# Patient Record
Sex: FEMALE | Race: WHITE | NOT HISPANIC OR LATINO | URBAN - METROPOLITAN AREA
[De-identification: names, ages, dates, MRNs, and addresses within clinical notes are randomized per-mention and may not be internally consistent; named-entity substitution may affect disease eponyms.]

---

## 2023-10-23 NOTE — PROGRESS NOTES
[id number] [address] SMILEY    Assessment/Plan:      Diagnosis ICD-10-CM Associated Orders   1. KEN (generalized anxiety disorder)  F41.1 Comprehensive metabolic panel      2. Screening for deficiency anemia  Z13.0 CBC and differential      3. Screening for lipid disorders  Z13.220 Lipid panel      4. Screening for thyroid disorder  Z13.29 TSH, 3rd generation with Free T4 reflex      5. History of Lyme disease  Z86.19 Lyme Total AB W Reflex to IGM/IGG     Tick-borne Disease, Acute Molecular Panel, Non-Lyme      6. Vitamin D deficiency  E55.9 Vitamin D 25 hydroxy      7. Need for hepatitis C screening test  Z11.59 Hepatitis C antibody      8. Encounter for screening for HIV  Z11.4 HIV 1/2 AG/AB W REFLEX [facility] and [facility] only        Obtain prior medical records  Please schedule eye exam  Obtain fasting bloodwork. No eating/caffeine for 8 hours prior. Drink plenty of water! Return in about 3 months (around 1/24/2024) for Annual physical, F/U after bloodwork. Patient may call or return to office with any questions or concerns. ______________________________________________________________________  Subjective:     Patient ID: Mariusz Vu is a 26 y.o. female. Mariusz Vu  Chief Complaint   Patient presents with    Establish Care       Here to establish care  Sees Dr. Attila Gutierrez, Dx PCOS. Not on OCP per choice. [place]CP [place], Dr. Amaury Duenas    Hx:  PCOS, acne vulgaris, KEN, auditory processing disorder (16yrs old), hx lymes disease tx with abx    Diet:    Breakfast:  breakfast sandwich, coffee (works at [employer] for the last 4 years)  Lunch:  salads, chicken tenders/fast, lunch meat sandwiches  Dinner:  well balanced    Alcohol: socially. Recently turned 23yr old. Tobacco:  none  Medical marijuana card for anxiety, smoking marijuana helps her fall asleep.       Can't remember last time she had blood work  Notices squinting at [employer] recently, no recent eye exam      Feels cold intolerance, weight loss of 42 lbs over the last 2 years due to going to the gym but then stopped and continued to lose weight.  +night sweats occasionally. Tested for lyme came back positive when sister became +tick borne illness, treated with antibiotics at that time. Thinks she was 18yr old. Depression Screening and Follow-up Plan: Patient was screened for depression during today's encounter. They screened negative with a PHQ-2 score of 0. The following portions of the patient's history were reviewed and updated as appropriate: allergies, current medications, past family history, past medical history, past social history, past surgical history, and problem list.    Review of Systems   Constitutional:  Positive for chills and unexpected weight change. Negative for activity change, appetite change, fatigue and fever. HENT:  Negative for congestion, ear pain, hearing loss, postnasal drip, sinus pain and trouble swallowing. Eyes: Negative. Respiratory: Negative. Negative for cough and shortness of breath. Cardiovascular: Negative. Negative for chest pain and leg swelling. Gastrointestinal: Negative. Negative for constipation and diarrhea. Endocrine: Negative. Genitourinary:  Positive for menstrual problem. Negative for dysuria. Musculoskeletal: Negative. Skin: Negative. Allergic/Immunologic: Negative. Negative for immunocompromised state. Neurological: Negative. Negative for dizziness, light-headedness and headaches. Hematological: Negative. Psychiatric/Behavioral:  Positive for sleep disturbance. The patient is nervous/anxious. Marijuana use helps with sleep. Objective:      Vitals:    10/24/23 1507   BP: 96/62   Pulse: 79   SpO2: 99%      Physical Exam  Vitals and nursing note reviewed. Constitutional:       Appearance: Normal appearance. HENT:      Head: Normocephalic and atraumatic.       Right Ear: Tympanic membrane, ear canal and external ear normal.      Left Ear: Tympanic membrane, ear canal and external ear normal.      Nose: Nose normal.      Mouth/Throat:      Mouth: Mucous membranes are moist.      Pharynx: Oropharynx is clear. Eyes:      Extraocular Movements: Extraocular movements intact. Conjunctiva/sclera: Conjunctivae normal.      Pupils: Pupils are equal, round, and reactive to light. Cardiovascular:      Rate and Rhythm: Normal rate and regular rhythm. Pulses: Normal pulses. Heart sounds: Normal heart sounds. Pulmonary:      Effort: Pulmonary effort is normal.      Breath sounds: Normal breath sounds. Abdominal:      General: Bowel sounds are normal.      Palpations: Abdomen is soft. Musculoskeletal:         General: Normal range of motion. Cervical back: Normal range of motion and neck supple. Skin:     General: Skin is warm and dry. Comments: Acne vulgaris   Neurological:      General: No focal deficit present. Mental Status: She is alert and oriented to person, place, and time. Psychiatric:         Mood and Affect: Mood normal.         Behavior: Behavior normal.         Thought Content: Thought content normal.         Judgment: Judgment normal.           Portions of the record may have been created with voice recognition software. Occasional wrong word or "sound alike" substitutions may have occurred due to the inherent limitations of voice recognition software. Please review the chart carefully and recognize, using context, where substitutions/typographical errors may have occurred.

## 2023-10-24 ENCOUNTER — OFFICE VISIT (OUTPATIENT)
Dept: FAMILY MEDICINE CLINIC | Facility: HOSPITAL | Age: 23
End: 2023-10-24
Payer: COMMERCIAL

## 2023-10-24 VITALS
HEIGHT: 63 IN | OXYGEN SATURATION: 99 % | HEART RATE: 79 BPM | SYSTOLIC BLOOD PRESSURE: 96 MMHG | BODY MASS INDEX: 20.55 KG/M2 | WEIGHT: 116 LBS | DIASTOLIC BLOOD PRESSURE: 62 MMHG

## 2023-10-24 DIAGNOSIS — Z86.19 HISTORY OF LYME DISEASE: ICD-10-CM

## 2023-10-24 DIAGNOSIS — Z13.220 SCREENING FOR LIPID DISORDERS: ICD-10-CM

## 2023-10-24 DIAGNOSIS — Z13.29 SCREENING FOR THYROID DISORDER: ICD-10-CM

## 2023-10-24 DIAGNOSIS — Z11.4 ENCOUNTER FOR SCREENING FOR HIV: ICD-10-CM

## 2023-10-24 DIAGNOSIS — Z11.59 NEED FOR HEPATITIS C SCREENING TEST: ICD-10-CM

## 2023-10-24 DIAGNOSIS — Z13.0 SCREENING FOR DEFICIENCY ANEMIA: ICD-10-CM

## 2023-10-24 DIAGNOSIS — E55.9 VITAMIN D DEFICIENCY: ICD-10-CM

## 2023-10-24 DIAGNOSIS — F41.1 GAD (GENERALIZED ANXIETY DISORDER): Primary | ICD-10-CM

## 2023-10-24 PROBLEM — H93.25 AUDITORY PROCESSING DISORDER: Status: ACTIVE | Noted: 2023-10-24

## 2023-10-24 PROBLEM — E28.2 PCOS (POLYCYSTIC OVARIAN SYNDROME): Status: ACTIVE | Noted: 2023-10-24

## 2023-10-24 PROCEDURE — 99203 OFFICE O/P NEW LOW 30 MIN: CPT | Performed by: NURSE PRACTITIONER

## 2023-10-24 NOTE — PATIENT INSTRUCTIONS
Obtain prior medical records  Please schedule eye exam  Obtain fasting bloodwork. No eating/caffeine for 8 hours prior. Drink plenty of water!

## 2023-10-25 ENCOUNTER — TELEPHONE (OUTPATIENT)
Dept: ADMINISTRATIVE | Facility: OTHER | Age: 23
End: 2023-10-25

## 2023-10-25 NOTE — LETTER
Procedure Request Form: Cervical Cancer Screening      Date Requested: 10/25/23  Patient: Kezia Davis  Patient : 2000   Referring Provider: SMILEY Sims        Date of Procedure ______________________________       The above patient has informed us that they have completed their   most recent Cervical Cancer Screening at your facility. Please complete   this form and attach all corresponding procedure reports/results. Comments __________________________________________________________  ____________________________________________________________________  ____________________________________________________________________  ____________________________________________________________________    Facility Completing Procedure _________________________________________    Form Completed By (print name) _______________________________________      Signature __________________________________________________________      These reports are needed for  compliance. Please fax this completed form and a copy of the procedure report to our office located at [address] [place] [address] as soon as possible to Fax [phone] attention Dipesh Skinner: Phone [phone]    We thank you for your assistance in treating our mutual patient.

## 2023-10-25 NOTE — TELEPHONE ENCOUNTER
Upon review of the In Basket request and the patient's chart, initial outreach has been made via fax to facility. Please see Contacts section for details.      Thank you  Colin Ingram

## 2023-10-25 NOTE — TELEPHONE ENCOUNTER
----- Message from Abdullahi William sent at 10/24/2023  3:20 PM EDT -----  Regarding: gyn exam  10/24/23 3:21 PM    Hello, our patient Leandro Gar has had Pap Smear (HPV) aka Cervical Cancer Screening completed/performed. Please assist in updating the patient chart by making an External outreach to [address] [place] [address] Dr Grayson Arroyo facility located in [place]. The date of service is 6-12 months ago.     Thank you,  Coco Rizzo MA  [facility]

## 2023-11-30 NOTE — TELEPHONE ENCOUNTER
As a follow-up, a second attempt has been made for outreach via fax to facility. Please see Contacts section for details.     Thank you  Virginia Arroyo

## 2023-12-05 NOTE — TELEPHONE ENCOUNTER
As a final attempt, a third outreach has been made via telephone call to facility. Please see Contacts section for details. This encounter will be closed and completed by end of day. Should we receive the requested information because of previous outreach attempts, the requested patient's chart will be updated appropriately.      Thank you  Faheem Holt

## 2023-12-14 NOTE — TELEPHONE ENCOUNTER
Upon review of the In Basket request we have not received the requested information after three attempts. Any additional questions or concerns should be emailed to the Practice Liaisons via the appropriate education email address, please do not reply via In Basket.     Thank you  Mercedes Weaver

## 2024-01-02 ENCOUNTER — OFFICE VISIT (OUTPATIENT)
Dept: FAMILY MEDICINE CLINIC | Facility: HOSPITAL | Age: 24
End: 2024-01-02
Payer: COMMERCIAL

## 2024-01-02 ENCOUNTER — TELEPHONE (OUTPATIENT)
Dept: FAMILY MEDICINE CLINIC | Facility: HOSPITAL | Age: 24
End: 2024-01-02

## 2024-01-02 VITALS
HEART RATE: 70 BPM | WEIGHT: 114 LBS | DIASTOLIC BLOOD PRESSURE: 60 MMHG | SYSTOLIC BLOOD PRESSURE: 114 MMHG | HEIGHT: 63 IN | OXYGEN SATURATION: 99 % | RESPIRATION RATE: 18 BRPM | BODY MASS INDEX: 20.2 KG/M2

## 2024-01-02 DIAGNOSIS — Z00.00 WELL ADULT EXAM: Primary | ICD-10-CM

## 2024-01-02 DIAGNOSIS — F41.1 GAD (GENERALIZED ANXIETY DISORDER): ICD-10-CM

## 2024-01-02 PROBLEM — E55.9 VITAMIN D INSUFFICIENCY: Status: ACTIVE | Noted: 2024-01-02

## 2024-01-02 PROCEDURE — 99395 PREV VISIT EST AGE 18-39: CPT | Performed by: NURSE PRACTITIONER

## 2024-01-02 NOTE — PROGRESS NOTES
History of Present Illness   Well Adult Physical   Patient here for a comprehensive physical exam.      Here for annual physical.  Obtained bloodwork but I don't have results.  Will reach out to [facility].  Feeling well, persistent anxiety.  Uses medical marijuana with THC.  Discussed how THC can increase anxiety.  Encouraged Kala to find non-THC source as she prefers not to be on medications.  Sees Dr. Fredy MONTOYA for woman's health/PCOS/dysmenorrhea.  Prefers no OCP.    Has not scheduled eye exam, encouraged to do so today as she still notes myopia.          Diet and Physical Activity  Diet: well balanced diet  Weight concerns: None, patient's BMI is between 18.5-24.9  Exercise:  not taking walks as much due to the weather, going to head back to the gym    EtOH: rare/occasional/daily/social/none: occasional     Depression Screen  PHQ-2/9 Depression Screening              General Health  Hearing: Normal:  bilateral  Vision: most recent eye exam >1 year  Dental: regular dental visits     History:  LMP: No LMP recorded.  : 0  Para: 0    Cancer Screening  Colononoscopy N/A  Mammogram N/A  Pap due, follows with GYN  Abnormal pap? no  Smoker never tobacco user, current marijuana use Annual screening with low-dose helical computed tomography (CT) for patients age 55 to 74 years with history of smoking at least 30 pack-years and, if a former smoker, had quit within the previous 15 years         The following portions of the patient's history were reviewed and updated as appropriate: allergies, current medications, past family history, past medical history, past social history, past surgical history and problem list.    Review of Systems     Review of Systems   Constitutional: Negative.  Negative for activity change, appetite change, chills, fatigue and fever.   HENT: Negative.  Negative for congestion, ear pain, postnasal drip and sinus pain.    Eyes: Negative.    Respiratory: Negative.  Negative for cough and  shortness of breath.    Cardiovascular: Negative.  Negative for chest pain and leg swelling.   Gastrointestinal: Negative.  Negative for constipation and diarrhea.   Endocrine: Negative.    Genitourinary:  Positive for menstrual problem. Negative for difficulty urinating and dysuria.        Regularly irregular.  Follows with GYN   Musculoskeletal: Negative.    Skin: Negative.    Allergic/Immunologic: Negative.  Negative for immunocompromised state.   Neurological: Negative.  Negative for dizziness and light-headedness.   Hematological: Negative.    Psychiatric/Behavioral:  Negative for sleep disturbance. The patient is nervous/anxious.        Past Medical History     Past Medical History:   Diagnosis Date    PCOS (polycystic ovarian syndrome)        Past Surgical History     Past Surgical History:   Procedure Laterality Date    WISDOM TOOTH EXTRACTION N/A 06/01/2023       Social History     Social History     Socioeconomic History    Marital status: Single     Spouse name: None    Number of children: None    Years of education: None    Highest education level: None   Occupational History    None   Tobacco Use    Smoking status: Never    Smokeless tobacco: Never   Vaping Use    Vaping status: Never Used   Substance and Sexual Activity    Alcohol use: Yes     Comment: Only occasionally    Drug use: Yes     Comment: medical marijuana    Sexual activity: Yes     Birth control/protection: Condom Male   Other Topics Concern    None   Social History Narrative    None     Social Determinants of Health     Financial Resource Strain: Not on file   Food Insecurity: Not on file   Transportation Needs: Not on file   Physical Activity: Not on file   Stress: Not on file   Social Connections: Not on file   Intimate Partner Violence: Not on file   Housing Stability: Not on file       Family History     Family History   Problem Relation Age of Onset    Thyroid disease Mother     Thyroid disease Father     Thyroid cancer Father      "Colin-Danlos syndrome Sister     No Known Problems Sister     No Known Problems Brother        Current Medications     No current outpatient medications on file.     Allergies     No Known Allergies    Objective     /60   Pulse 70   Resp 18   Ht 5' 3\" (1.6 m)   Wt 51.7 kg (114 lb)   SpO2 99%   BMI 20.19 kg/m²   Wt Readings from Last 3 Encounters:   01/02/24 51.7 kg (114 lb)   10/24/23 52.6 kg (116 lb)     BP Readings from Last 3 Encounters:   01/02/24 114/60   10/24/23 96/62     Pulse Readings from Last 3 Encounters:   01/02/24 70   10/24/23 79     Body mass index is 20.19 kg/m².     Physical Exam  Vitals and nursing note reviewed.   Constitutional:       Appearance: Normal appearance.   HENT:      Head: Normocephalic and atraumatic.      Right Ear: Tympanic membrane, ear canal and external ear normal.      Left Ear: Tympanic membrane, ear canal and external ear normal.      Nose: Nose normal.      Mouth/Throat:      Mouth: Mucous membranes are moist.      Pharynx: Oropharynx is clear.   Eyes:      Extraocular Movements: Extraocular movements intact.      Conjunctiva/sclera: Conjunctivae normal.      Pupils: Pupils are equal, round, and reactive to light.   Cardiovascular:      Rate and Rhythm: Normal rate and regular rhythm.      Pulses: Normal pulses.      Heart sounds: Normal heart sounds.   Pulmonary:      Effort: Pulmonary effort is normal.      Breath sounds: Normal breath sounds.   Abdominal:      General: Bowel sounds are normal.      Palpations: Abdomen is soft.   Musculoskeletal:         General: Normal range of motion.      Cervical back: Normal range of motion and neck supple.   Skin:     General: Skin is warm and dry.   Neurological:      General: No focal deficit present.      Mental Status: She is alert and oriented to person, place, and time.   Psychiatric:         Mood and Affect: Mood normal.         Speech: Speech normal.         Behavior: Behavior normal. Behavior is cooperative.    " "     Thought Content: Thought content normal.         Judgment: Judgment normal.           No results found.    Health Maintenance     Health Maintenance   Topic Date Due    Hepatitis C Screening  Never done    HPV Vaccine (1 - 2-dose series) Never done    HIV Screening  Never done    Chlamydia Screening  Never done    DTaP,Tdap,and Td Vaccines (1 - Tdap) Never done    Cervical Cancer Screening  Never done    COVID-19 Vaccine (2 - 2023-24 season) 04/02/2024 (Originally 9/1/2023)    Influenza Vaccine (1) 06/30/2024 (Originally 9/1/2023)    Depression Screening  10/24/2024    Annual Physical  01/02/2025    Pneumococcal Vaccine: Pediatrics (0 to 5 Years) and At-Risk Patients (6 to 64 Years)  Aged Out    HIB Vaccine  Aged Out    IPV Vaccine  Aged Out    Hepatitis A Vaccine  Aged Out    Meningococcal ACWY Vaccine  Aged Out     Immunization History   Administered Date(s) Administered    COVID-19 J&J ([place]) vaccine 0.5 mL 04/27/2021       Laboratory Results:   No results found for: \"WBC\", \"HGB\", \"HCT\", \"MCV\", \"PLT\"  No results found for: \"BUN\"  No results found for: \"GLUC\", \"ALT\", \"AST\"  No results found for: \"TSH\"  No results found for: \"A1C\"    Lipid Profile:   No results found for: \"CHOL\"  No results found for: \"HDL\"  No results found for: \"LDLC\"  No results found for: \"LDLCALC\"  No results found for: \"TRIG\"      Assessment/Plan     1. Healthy female exam.  2. Patient Counseling:   Nutrition: Stressed importance of a well balanced diet, moderation of sodium/saturated fat, caloric balance and sufficient intake of fiber  Exercise: Stressed the importance of regular exercise with a goal of 150 minutes per week  Dental Health: Discussed daily flossing and brushing and regular dental visits   Sexuality: Discussed sexually transmitted infections, use of condoms and prevention of unintended pregnancy  Alcohol Use:  Recommended moderation of alcohol intake  Injury Prevention: Discussed Safety Belts, Safety Helmets, and Smoke " Detectors    Immunizations reviewed.   Discussed benefits of screening .  Discussed the patient's BMI with her.  The BMI is in the acceptable range  3. Cancer Screening discussed.  4. Labs reaching out to [facility] to obtain blood work results.   5. Follow up next physical in 1 year.    SMILEY Guerrero

## 2024-01-02 NOTE — TELEPHONE ENCOUNTER
----- Message from SMILEY Tinajero sent at 1/2/2024  4:39 PM EST -----  Received Kala's labwork results which are overall stable.  Vitamin D level is insufficient, would recommend starting Vitamin D3 1000 IU daily.  Tick borne panel negative, however awaiting basesia IgG as [facility] is missing testing materials; they will send results as soon as possible.

## 2024-01-02 NOTE — PATIENT INSTRUCTIONS
Consider COVID, FLU vaccine.  Schedule eye exam  Consider obtaining CBD, non THC source marijuana as THC can contribute to anxiety.    Will reach out with labwork results.

## 2024-01-09 LAB
25(OH)D3+25(OH)D2 SERPL-MCNC: 27.2 NG/ML (ref 30–100)
A PHAGOCYTOPH IGG TITR SER IF: NEGATIVE {TITER}
ALBUMIN SERPL-MCNC: 4.5 G/DL (ref 4–5)
ALBUMIN/GLOB SERPL: 2 {RATIO} (ref 1.2–2.2)
ALP SERPL-CCNC: 54 IU/L (ref 44–121)
ALT SERPL-CCNC: 7 IU/L (ref 0–32)
AST SERPL-CCNC: 11 IU/L (ref 0–40)
B BURGDOR IGG+IGM SER QL IA: NEGATIVE
B MICROTI IGG TITR SER: NORMAL {TITER}
BASOPHILS # BLD AUTO: 0 X10E3/UL (ref 0–0.2)
BASOPHILS NFR BLD AUTO: 1 %
BILIRUB SERPL-MCNC: 0.2 MG/DL (ref 0–1.2)
BUN SERPL-MCNC: 10 MG/DL (ref 6–20)
BUN/CREAT SERPL: 14 (ref 9–23)
CALCIUM SERPL-MCNC: 9.7 MG/DL (ref 8.7–10.2)
CHLORIDE SERPL-SCNC: 104 MMOL/L (ref 96–106)
CHOLEST SERPL-MCNC: 128 MG/DL (ref 100–199)
CO2 SERPL-SCNC: 23 MMOL/L (ref 20–29)
CREAT SERPL-MCNC: 0.71 MG/DL (ref 0.57–1)
E CHAFFEENSIS IGG TITR SER IF: NEGATIVE {TITER}
EGFR: 124 ML/MIN/1.73
EOSINOPHIL # BLD AUTO: 0.2 X10E3/UL (ref 0–0.4)
EOSINOPHIL NFR BLD AUTO: 3 %
ERYTHROCYTE [DISTWIDTH] IN BLOOD BY AUTOMATED COUNT: 12.1 % (ref 11.7–15.4)
GLOBULIN SER-MCNC: 2.2 G/DL (ref 1.5–4.5)
GLUCOSE SERPL-MCNC: 90 MG/DL (ref 70–99)
HCT VFR BLD AUTO: 36.7 % (ref 34–46.6)
HCV AB S/CO SERPL IA: NON REACTIVE
HDLC SERPL-MCNC: 56 MG/DL
HGB BLD-MCNC: 12.5 G/DL (ref 11.1–15.9)
HIV 1+2 AB+HIV1 P24 AG SERPL QL IA: NON REACTIVE
IMM GRANULOCYTES # BLD: 0 X10E3/UL (ref 0–0.1)
IMM GRANULOCYTES NFR BLD: 0 %
LDLC SERPL CALC-MCNC: 64 MG/DL (ref 0–99)
LYMPHOCYTES # BLD AUTO: 1.7 X10E3/UL (ref 0.7–3.1)
LYMPHOCYTES NFR BLD AUTO: 30 %
MCH RBC QN AUTO: 32.3 PG (ref 26.6–33)
MCHC RBC AUTO-ENTMCNC: 34.1 G/DL (ref 31.5–35.7)
MCV RBC AUTO: 95 FL (ref 79–97)
MONOCYTES # BLD AUTO: 0.3 X10E3/UL (ref 0.1–0.9)
MONOCYTES NFR BLD AUTO: 6 %
NEUTROPHILS # BLD AUTO: 3.6 X10E3/UL (ref 1.4–7)
NEUTROPHILS NFR BLD AUTO: 60 %
PLATELET # BLD AUTO: 284 X10E3/UL (ref 150–450)
POTASSIUM SERPL-SCNC: 4.2 MMOL/L (ref 3.5–5.2)
PROT SERPL-MCNC: 6.7 G/DL (ref 6–8.5)
RBC # BLD AUTO: 3.87 X10E6/UL (ref 3.77–5.28)
RESULT/COMMENT: NORMAL
SL AMB VLDL CHOLESTEROL CALC: 8 MG/DL (ref 5–40)
SODIUM SERPL-SCNC: 140 MMOL/L (ref 134–144)
TRIGL SERPL-MCNC: 29 MG/DL (ref 0–149)
TSH SERPL DL<=0.005 MIU/L-ACNC: 0.47 UIU/ML (ref 0.45–4.5)
WBC # BLD AUTO: 5.9 X10E3/UL (ref 3.4–10.8)

## 2024-06-24 NOTE — PROGRESS NOTES
[facility]   Marlyn REIS    Assessment/Plan:   1. KEN (generalized anxiety disorder)  Assessment & Plan:  Managed with medical marijuana.  Prefers not to use medications  2. Vitamin D insufficiency  Assessment & Plan:   Latest Reference Range & Units 12/04/23 08:56   25-Hydroxy, Vitamin D 30.0 - 100.0 ng/mL 27.2 (L)   (L): Data is abnormally low    Started taking vitamin D supplementation OTC after results.  She is not sure kind/how much  3. Screening for thyroid disorder  -     TSH, 3rd generation with Free T4 reflex; Future  -     T4, free; Future  -     TSH, 3rd generation with Free T4 reflex  -     T4, free  4. Iron deficiency anemia secondary to inadequate dietary iron intake  -     Ferritin; Future  -     Ferritin  5. Generalized hyperhidrosis  6. PCOS (polycystic ovarian syndrome)  Assessment & Plan:  Followed by Dr. [facility]       Return for F/U after bloodwork.  Patient may call or return to office with any questions or concerns.     ______________________________________________________________________  Subjective:     Patient ID: Kala Bernal is a 23 y.o. female.  Kala Bernal  Chief Complaint   Patient presents with    Excessive Sweating       Notes hyperhidrosis especially axilla/back/neck since the winter notes daytime as well as nighttime.  No fever/chills.  No change in appetite, stays hydrated.  No chest pressure/pain/palpitations.  Bp WNL today and past OV.  Does get dizzy with position changes sometimes; is iron deficient and takes supplement.  Hx PCOS, notes menstruation cycle typically q2-3 months but went 6 months without it; continues to defer OCP.  GYN is aware.  Bowel pattern stable, no diarrhea.  No hair loss.  Fingernails brittle.  Mood stable.  Both parents with thyroid issues.    Did start vitamin D supplementation.   Appetite stable and well balanced.  Reports 45lb weight loss over the past 2 years unintentionally.  Uses medical marijuana with THC for  anxiety.                   The following portions of the patient's history were reviewed and updated as appropriate: allergies, current medications, past family history, past medical history, past social history, past surgical history, and problem list.    Review of Systems   Constitutional:  Positive for diaphoresis and unexpected weight change. Negative for activity change, appetite change, chills, fatigue and fever.   HENT: Negative.  Negative for congestion, ear pain, postnasal drip and sinus pain.    Eyes: Negative.    Respiratory: Negative.  Negative for cough and shortness of breath.    Cardiovascular: Negative.  Negative for chest pain and leg swelling.   Gastrointestinal: Negative.  Negative for constipation and diarrhea.   Endocrine: Positive for cold intolerance and heat intolerance.   Genitourinary:  Positive for menstrual problem. Negative for dysuria.        Irregular due to PCOS   Musculoskeletal: Negative.    Skin: Negative.    Allergic/Immunologic: Negative.  Negative for immunocompromised state.   Neurological:  Positive for dizziness and tremors. Negative for light-headedness.        Occasional tremor in the morning   Hematological: Negative.    Psychiatric/Behavioral:  Positive for sleep disturbance.         Reports waking up with night sweats         Objective:      Vitals:    06/25/24 1013   BP: 102/60   Pulse: 85   SpO2: 99%      Physical Exam  Vitals and nursing note reviewed.   Constitutional:       General: She is awake.      Appearance: Normal appearance.   HENT:      Head: Normocephalic and atraumatic.      Right Ear: Tympanic membrane, ear canal and external ear normal.      Left Ear: Tympanic membrane, ear canal and external ear normal.      Nose: Nose normal.      Mouth/Throat:      Mouth: Mucous membranes are moist.      Pharynx: Oropharynx is clear.   Eyes:      Extraocular Movements: Extraocular movements intact.      Conjunctiva/sclera: Conjunctivae normal.      Pupils: Pupils are  "equal, round, and reactive to light.   Neck:      Thyroid: No thyroid mass, thyromegaly or thyroid tenderness.   Cardiovascular:      Rate and Rhythm: Normal rate and regular rhythm.      Pulses: Normal pulses.      Heart sounds: Normal heart sounds.   Pulmonary:      Effort: Pulmonary effort is normal.      Breath sounds: Normal breath sounds.   Abdominal:      General: Bowel sounds are normal.      Palpations: Abdomen is soft.   Musculoskeletal:         General: Normal range of motion.      Cervical back: Normal range of motion and neck supple.   Lymphadenopathy:      Cervical: No cervical adenopathy.   Skin:     General: Skin is warm and dry.   Neurological:      General: No focal deficit present.      Mental Status: She is alert and oriented to person, place, and time.   Psychiatric:         Mood and Affect: Mood is anxious.         Speech: Speech normal.         Behavior: Behavior normal. Behavior is cooperative.         Thought Content: Thought content normal.         Judgment: Judgment normal.           Portions of the record may have been created with voice recognition software. Occasional wrong word or \"sound alike\" substitutions may have occurred due to the inherent limitations of voice recognition software. Please review the chart carefully and recognize, using context, where substitutions/typographical errors may have occurred.       "

## 2024-06-25 ENCOUNTER — OFFICE VISIT (OUTPATIENT)
Dept: FAMILY MEDICINE CLINIC | Facility: HOSPITAL | Age: 24
End: 2024-06-25
Payer: COMMERCIAL

## 2024-06-25 VITALS
HEART RATE: 85 BPM | DIASTOLIC BLOOD PRESSURE: 60 MMHG | OXYGEN SATURATION: 99 % | WEIGHT: 113 LBS | BODY MASS INDEX: 20.02 KG/M2 | SYSTOLIC BLOOD PRESSURE: 102 MMHG | HEIGHT: 63 IN

## 2024-06-25 DIAGNOSIS — E55.9 VITAMIN D INSUFFICIENCY: ICD-10-CM

## 2024-06-25 DIAGNOSIS — F41.1 GAD (GENERALIZED ANXIETY DISORDER): Primary | ICD-10-CM

## 2024-06-25 DIAGNOSIS — R61 GENERALIZED HYPERHIDROSIS: ICD-10-CM

## 2024-06-25 DIAGNOSIS — D50.8 IRON DEFICIENCY ANEMIA SECONDARY TO INADEQUATE DIETARY IRON INTAKE: ICD-10-CM

## 2024-06-25 DIAGNOSIS — Z13.29 SCREENING FOR THYROID DISORDER: ICD-10-CM

## 2024-06-25 DIAGNOSIS — E28.2 PCOS (POLYCYSTIC OVARIAN SYNDROME): ICD-10-CM

## 2024-06-25 PROCEDURE — 99214 OFFICE O/P EST MOD 30 MIN: CPT | Performed by: NURSE PRACTITIONER

## 2024-06-25 NOTE — ASSESSMENT & PLAN NOTE
Latest Reference Range & Units 12/04/23 08:56   25-Hydroxy, Vitamin D 30.0 - 100.0 ng/mL 27.2 (L)   (L): Data is abnormally low    Started taking vitamin D supplementation OTC after results.  She is not sure kind/how much

## 2024-07-22 ENCOUNTER — OFFICE VISIT (OUTPATIENT)
Dept: FAMILY MEDICINE CLINIC | Facility: HOSPITAL | Age: 24
End: 2024-07-22
Payer: COMMERCIAL

## 2024-07-22 VITALS
DIASTOLIC BLOOD PRESSURE: 56 MMHG | SYSTOLIC BLOOD PRESSURE: 90 MMHG | BODY MASS INDEX: 20.02 KG/M2 | HEART RATE: 76 BPM | OXYGEN SATURATION: 99 % | WEIGHT: 113 LBS

## 2024-07-22 DIAGNOSIS — F32.9 REACTIVE DEPRESSION: ICD-10-CM

## 2024-07-22 DIAGNOSIS — F41.1 GAD (GENERALIZED ANXIETY DISORDER): Primary | ICD-10-CM

## 2024-07-22 PROCEDURE — 99213 OFFICE O/P EST LOW 20 MIN: CPT | Performed by: NURSE PRACTITIONER

## 2024-07-22 RX ORDER — DULOXETIN HYDROCHLORIDE 30 MG/1
30 CAPSULE, DELAYED RELEASE ORAL DAILY
Qty: 30 CAPSULE | Refills: 5 | Status: SHIPPED | OUTPATIENT
Start: 2024-07-22

## 2024-07-22 RX ORDER — HYDROXYZINE HYDROCHLORIDE 10 MG/1
10 TABLET, FILM COATED ORAL EVERY 6 HOURS PRN
Qty: 30 TABLET | Refills: 0 | Status: SHIPPED | OUTPATIENT
Start: 2024-07-22

## 2024-07-22 NOTE — PROGRESS NOTES
[facility]   Marlyn REIS    Assessment/Plan:   1. KEN (generalized anxiety disorder)  -     DULoxetine (CYMBALTA) 30 mg delayed release capsule; Take 1 capsule (30 mg total) by mouth daily  -     hydrOXYzine HCL (ATARAX) 10 mg tablet; Take 1 tablet (10 mg total) by mouth every 6 (six) hours as needed for anxiety  2. Reactive depression  -     DULoxetine (CYMBALTA) 30 mg delayed release capsule; Take 1 capsule (30 mg total) by mouth daily      Optimize hydration/nutrition  Start duloxetine daily.    Use hydroxyzine only for panic attacks.   Follow up with GYN  Return in about 4 weeks (around 8/19/2024) for Recheck.  Patient may call or return to office with any questions or concerns.     ______________________________________________________________________  Subjective:     Patient ID: Kala Bernal is a 23 y.o. female.  Kala Bernal  Chief Complaint   Patient presents with    Anxiety     Here for follow up, accompanied by sister who assists with HPI/assessment.  Hx KEN, PCOS now situational depression.  Home life stressful; feels like a lot falls on her.  Looking for apartment.  Has used MMJ for sleep/anxiety although no longer as effective.  Has not tried medication nor CBT.  Anxiety and depression causing food aversion.                     The following portions of the patient's history were reviewed and updated as appropriate: allergies, current medications, past family history, past medical history, past social history, past surgical history, and problem list.    Review of Systems   Constitutional: Negative.  Negative for activity change, appetite change, chills, fatigue and fever.   HENT: Negative.  Negative for congestion, ear pain, postnasal drip and sinus pain.    Eyes: Negative.    Respiratory: Negative.  Negative for cough and shortness of breath.    Cardiovascular: Negative.  Negative for chest pain and leg swelling.   Gastrointestinal: Negative.  Negative for constipation  and diarrhea.   Endocrine: Positive for heat intolerance.   Genitourinary:  Positive for menstrual problem. Negative for dysuria.        Irregular due to untreated PCOS   Musculoskeletal: Negative.    Skin:  Positive for rash.   Allergic/Immunologic: Negative.  Negative for immunocompromised state.   Neurological: Negative.  Negative for dizziness and light-headedness.   Hematological: Negative.    Psychiatric/Behavioral:  Positive for dysphoric mood and sleep disturbance. Negative for suicidal ideas. The patient is nervous/anxious.         Due to sweating and anxiety         Objective:      Vitals:    07/22/24 1130   BP: 90/56   Pulse: 76   SpO2: 99%      Physical Exam  Vitals and nursing note reviewed.   Constitutional:       Appearance: Normal appearance.   HENT:      Head: Normocephalic and atraumatic.      Right Ear: Tympanic membrane, ear canal and external ear normal.      Left Ear: Tympanic membrane, ear canal and external ear normal.      Nose: Nose normal.      Mouth/Throat:      Mouth: Mucous membranes are moist.      Pharynx: Oropharynx is clear.   Eyes:      Extraocular Movements: Extraocular movements intact.      Conjunctiva/sclera: Conjunctivae normal.      Pupils: Pupils are equal, round, and reactive to light.   Cardiovascular:      Rate and Rhythm: Normal rate and regular rhythm.      Pulses: Normal pulses.      Heart sounds: Normal heart sounds.   Pulmonary:      Effort: Pulmonary effort is normal.      Breath sounds: Normal breath sounds.   Abdominal:      General: Bowel sounds are normal.      Palpations: Abdomen is soft.   Musculoskeletal:         General: Normal range of motion.      Cervical back: Normal range of motion and neck supple.   Skin:     General: Skin is warm and dry.      Comments: Acne     Neurological:      General: No focal deficit present.      Mental Status: She is alert and oriented to person, place, and time.   Psychiatric:         Mood and Affect: Mood is anxious and  "depressed. Affect is tearful.         Behavior: Behavior normal.         Thought Content: Thought content normal.         Judgment: Judgment normal.           Portions of the record may have been created with voice recognition software. Occasional wrong word or \"sound alike\" substitutions may have occurred due to the inherent limitations of voice recognition software. Please review the chart carefully and recognize, using context, where substitutions/typographical errors may have occurred.       "

## 2024-07-22 NOTE — PATIENT INSTRUCTIONS
Optimize hydration/nutrition  Start duloxetine daily.    Use hydroxyzine only for panic attacks.   Follow up with GYN

## 2024-09-06 ENCOUNTER — TELEMEDICINE (OUTPATIENT)
Dept: FAMILY MEDICINE CLINIC | Facility: HOSPITAL | Age: 24
End: 2024-09-06
Payer: COMMERCIAL

## 2024-09-06 ENCOUNTER — TELEPHONE (OUTPATIENT)
Age: 24
End: 2024-09-06

## 2024-09-06 VITALS — WEIGHT: 113 LBS | HEIGHT: 63 IN | BODY MASS INDEX: 20.02 KG/M2

## 2024-09-06 DIAGNOSIS — F41.1 GAD (GENERALIZED ANXIETY DISORDER): Primary | ICD-10-CM

## 2024-09-06 DIAGNOSIS — F32.9 REACTIVE DEPRESSION: ICD-10-CM

## 2024-09-06 PROCEDURE — 99213 OFFICE O/P EST LOW 20 MIN: CPT | Performed by: NURSE PRACTITIONER

## 2024-09-06 NOTE — ASSESSMENT & PLAN NOTE
KEN score 4 today  Overall anxiety much improved with Cymbalta 30 mg daily and rare use of hydroxyzine for panic attacks.  Does need to establish with behavioral health for CBT.  Referral in place.

## 2024-09-06 NOTE — ASSESSMENT & PLAN NOTE
PHQ-2/9 Depression Screening    Little interest or pleasure in doing things: 1 - several days  Feeling down, depressed, or hopeless: 1 - several days  Trouble falling or staying asleep, or sleeping too much: 1 - several days  Feeling tired or having little energy: 1 - several days  Poor appetite or overeatin - several days  Feeling bad about yourself - or that you are a failure or have let yourself or your family down: 1 - several days  Trouble concentrating on things, such as reading the newspaper or watching television: 2 - more than half the days  Moving or speaking so slowly that other people could have noticed. Or the opposite - being so fidgety or restless that you have been moving around a lot more than usual: 1 - several days  Thoughts that you would be better off dead, or of hurting yourself in some way: 0 - not at all  PHQ-9 Score: 9  PHQ-9 Interpretation: Mild depression          Overall depression much improved with duloxetine 30 mg daily.  Referral to behavioral health in place.  Routine lab work ordered to be completed prior to physical.

## 2024-09-06 NOTE — PROGRESS NOTES
Virtual Regular Visit  Name: Kala Bernal      : 2000      MRN: [id number]  Encounter Provider: SMILEY Guerrero  Encounter Date: 2024   Encounter department: [facility] [address]    Verification of patient location:    Patient is located at Home in the following state in which I hold an active license [place]    Assessment & Plan   1. KEN (generalized anxiety disorder)  Assessment & Plan:  KEN score 4 today  Overall anxiety much improved with Cymbalta 30 mg daily and rare use of hydroxyzine for panic attacks.  Does need to establish with behavioral health for CBT.  Referral in place.  Orders:  -     Ambulatory referral to Psych Services; Future  2. Reactive depression  Assessment & Plan:  PHQ-2/9 Depression Screening    Little interest or pleasure in doing things: 1 - several days  Feeling down, depressed, or hopeless: 1 - several days  Trouble falling or staying asleep, or sleeping too much: 1 - several days  Feeling tired or having little energy: 1 - several days  Poor appetite or overeatin - several days  Feeling bad about yourself - or that you are a failure or have let yourself or your family down: 1 - several days  Trouble concentrating on things, such as reading the newspaper or watching television: 2 - more than half the days  Moving or speaking so slowly that other people could have noticed. Or the opposite - being so fidgety or restless that you have been moving around a lot more than usual: 1 - several days  Thoughts that you would be better off dead, or of hurting yourself in some way: 0 - not at all  PHQ-9 Score: 9  PHQ-9 Interpretation: Mild depression          Overall depression much improved with duloxetine 30 mg daily.  Referral to behavioral health in place.  Routine lab work ordered to be completed prior to physical.  Orders:  -     Ambulatory referral to Psych Services; Future      Depression Screening and Follow-up Plan: Patient's depression  screening was positive with a PHQ-9 score of 9. Patient assessed for underlying major depression. Brief counseling provided and recommend additional follow-up/re-evaluation next office visit. Patient with underlying depression and was advised to continue current medications as prescribed.       Encounter provider SMILEY Guerrero    The patient was identified by name and date of birth. Kala Bernal was informed that this is a telemedicine visit and that the visit is being conducted through the Epic Embedded platform. She agrees to proceed..  My office door was closed. No one else was in the room.  She acknowledged consent and understanding of privacy and security of the video platform. The patient has agreed to participate and understands they can discontinue the visit at any time.    Patient is aware this is a billable service.     History of Present Illness     Presents virtually for follow-up.  Reports anxiety and depression much improved with starting Cymbalta.  She has been using the hydroxyzine sporadically for panic attacks.  Her appetite has improved and she is sleeping well at night.  Home life while still stressful easier to manage.  She continues to look for an apartment.  She would like to establish CBT in which she could complete virtually.          Review of Systems   Constitutional:  Positive for appetite change. Negative for activity change, chills, fatigue and fever.        Appetite improved    HENT: Negative.  Negative for congestion, ear pain, postnasal drip and sinus pain.    Eyes: Negative.    Respiratory: Negative.  Negative for shortness of breath.    Cardiovascular: Negative.  Negative for chest pain and leg swelling.   Gastrointestinal: Negative.  Negative for constipation and diarrhea.   Endocrine: Negative.    Genitourinary: Negative.  Negative for dysuria.   Musculoskeletal: Negative.    Skin: Negative.    Allergic/Immunologic: Negative.    Neurological: Negative.  Negative for  "dizziness, light-headedness and numbness.   Hematological: Negative.    Psychiatric/Behavioral:  Negative for sleep disturbance. The patient is nervous/anxious.        Objective     Ht 5' 3\" (1.6 m)   Wt 51.3 kg (113 lb)   BMI 20.02 kg/m²   Physical Exam  Vitals and nursing note reviewed.   Constitutional:       General: She is not in acute distress.     Appearance: She is well-developed.   HENT:      Head: Normocephalic and atraumatic.   Eyes:      Conjunctiva/sclera: Conjunctivae normal.   Cardiovascular:      Rate and Rhythm: Normal rate and regular rhythm.      Heart sounds: No murmur heard.  Pulmonary:      Effort: Pulmonary effort is normal. No respiratory distress.      Breath sounds: Normal breath sounds.   Abdominal:      Palpations: Abdomen is soft.      Tenderness: There is no abdominal tenderness.   Musculoskeletal:         General: No swelling.      Cervical back: Neck supple.   Skin:     General: Skin is warm and dry.      Capillary Refill: Capillary refill takes less than 2 seconds.   Neurological:      Mental Status: She is alert.   Psychiatric:         Mood and Affect: Mood normal.         Visit Time  Total Visit Duration: 15 minutes        "

## 2024-09-06 NOTE — TELEPHONE ENCOUNTER
Please reprint labs for patient to  at office today, labs are   TSH 3rd generation  Free T4  Ferritin   Thank you

## 2024-09-09 ENCOUNTER — TELEPHONE (OUTPATIENT)
Age: 24
End: 2024-09-09

## 2024-09-09 NOTE — TELEPHONE ENCOUNTER
"Behavioral Health Integration Screening Questionnaire     Are you aware of the referred from your [facility] Provider  : Yes     Please advise interviewee that they need to answer all questions truthfully to allow for best care, and any misrepresentations of information may affect their ability to be seen at this clinic   => Was this discussed? Yes     If Minor Child (under age 18)    Who is/are the legal guardian(s) of the child?     Is there a custody agreement? No     If \"YES\"- Custody orders must be obtained prior to scheduling the first appointment  In addition, Consent to Treatment must be signed by all legal guardians prior to scheduling the first appointment    If \"NO\"- Consent to Treatment must be signed by all legal guardians prior to scheduling the first appointment    Behavioral Health Outpatient Intake History -     Presenting Problem (in patient's own words): anxiety, general depression, need an emotional support animal    Are there any communication barriers for this patient?     No                                               If yes, please describe barriers:       Are you taking any psychiatric medications? Yes     If \"YES\" -What are they  Duloxetine      If \"YES\" -Who prescribes?     Has the Patient abused alcohol or other substances in the last 6 months ? No  No concerns of substance abuse are reported.     If \"YES\" -What substance, How much, How often?ocassional drink, not much; she has marijuana card but she just smoke to help her to sleept     If illegal substance: Refer to [facility] (for JANET) or [facility].   If Alcohol in excess of 10 drinks per week:  Refer to [facility] (for JANET) or [facility]    ACCEPTED as a patient Yes  If \"Yes\" Appointment Date: 10/2/2024 at 4:00 pm    Referred Elsewhere? No  If “Yes” - (Where? Ex: Therapy Anywhere; [facility];  [facility], etc.)       Name of Insurance Co: [facility] [employer]  Insurance ID# " [id number]  Insurance Phone # [phone]  If ins is primary or secondary? Primary  If patient is a minor, parents information such as Name, D.O.B of guarantor.

## 2024-09-10 LAB
FERRITIN SERPL-MCNC: 69 NG/ML (ref 15–150)
T4 FREE SERPL-MCNC: 1.44 NG/DL (ref 0.82–1.77)
TSH SERPL DL<=0.005 MIU/L-ACNC: 0.93 UIU/ML (ref 0.45–4.5)

## 2024-09-30 DIAGNOSIS — F41.1 GAD (GENERALIZED ANXIETY DISORDER): ICD-10-CM

## 2024-10-01 RX ORDER — HYDROXYZINE HYDROCHLORIDE 10 MG/1
10 TABLET, FILM COATED ORAL EVERY 6 HOURS PRN
Qty: 30 TABLET | Refills: 0 | Status: SHIPPED | OUTPATIENT
Start: 2024-10-01

## 2024-10-09 ENCOUNTER — OFFICE VISIT (OUTPATIENT)
Dept: BEHAVIORAL/MENTAL HEALTH CLINIC | Facility: CLINIC | Age: 24
End: 2024-10-09
Payer: COMMERCIAL

## 2024-10-09 DIAGNOSIS — F41.1 GAD (GENERALIZED ANXIETY DISORDER): Primary | ICD-10-CM

## 2024-10-09 PROCEDURE — 90791 PSYCH DIAGNOSTIC EVALUATION: CPT | Performed by: SOCIAL WORKER

## 2024-10-10 NOTE — PSYCH
" Behavioral Health Psychotherapy Assessment    Date of Initial Psychotherapy Assessment: 10/10/24  Referral Source: Marlyn Ding  Has a release of information been signed for the referral source?     Preferred Name: Kala Bernal  Preferred Pronouns: She/her  YOB: 2000 Age: 23 y.o.  Sex assigned at birth: female   Gender Identity: female  Race:   Preferred Language: English    Emergency Contact:  Full Name:   Relationship to Client:   Contact information:     Primary Care Physician:  SMILEY Guerrero  [facility] [facility] [address]  [place] [address]  [phone]  Has a release of information been signed?     Physical Health History:  Past surgical procedures:   Do you have a history of any of the following: PCOS  Do you have any mobility issues?     Relevant Family History:   Ms. Bernal's parents are , and she currently resides with her father and ill older sister.    Presenting Problem (What brings you in?)   comes in stating that she feels completely overwhelmed with living with her father and having to help take care of her older sister with lyme disease. 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 During the evaluation she was tearful when explaining the about of stress she was under. The anxiety has also lead to her developing sleep issues. She revealed that she has been using medical mariajuana to help her fall asleep. Throughout the evaluation there were repeated instances of interpersonal conflict with various family member (mother ,father, and older sister).     Mental Health Advance Directive:  Do you currently have a Mental Health Advance Directive?    Diagnosis:   Diagnosis ICD-10-CM Associated Orders   1. KEN (generalized " anxiety disorder)  F41.1           Initial Assessment:     Current Mental Status:    Appearance: appropriate      Behavior/Manner: cooperative and tearful      Affect/Mood:  Anxious and sad    Speech:  Pressured    Sleep:  Interrupted    Oriented to: oriented to self, oriented to place and oriented to time       Clinical Symptoms    Anxiety: yes      Depression Symptoms: restlessness      Anxiety Symptoms: excessive worry and restlessness      Have you ever been self-injurious: No      Counseling History:  Previous Counseling or Treatment  (Mental Health or Drug & Alcohol): No    Have you previously taken psychiatric medications: Yes    Previous Medications Attempted:  Hydroxzine    Substance Abuse/Addiction Assessment:  Marijuana: Yes      Relationship History:    Natural Supports:  Siblings and mother    Employment History    Are you currently employed: Yes      Employer/ Job title:  Recpetionist    Sources of income/financial support:  Work    Recommended Treatment:     Psychotherapy:  Individual sessions    Frequency:  1 time    Session frequency:  Weekly      Visit start and stop times:    10/10/24  Start Time: 1600  Stop Time: 1655  Total Visit Time: 55 minutes

## 2024-10-22 ENCOUNTER — DOCUMENTATION (OUTPATIENT)
Dept: BEHAVIORAL/MENTAL HEALTH CLINIC | Facility: CLINIC | Age: 24
End: 2024-10-22

## 2024-11-07 ENCOUNTER — DOCUMENTATION (OUTPATIENT)
Age: 24
End: 2024-11-07

## 2024-11-07 NOTE — PROGRESS NOTES
Psychotherapy Discharge Summary    Preferred Name: Kala Bernal  YOB: 2000    Admission date to psychotherapy: 10/9/2024    Referred by: SMILEY Tinajero    Presenting Problem: Multiple stressors and relationship difficulties     Course of treatment included : individual therapy     Progress/Outcome of Treatment Goals (brief summary of course of treatment) N/A; client did not return after first session    Treatment Complications (if any):     Treatment Progress:  N/A    Current [facility] Psychiatric Provider:     Discharge Medications include:     Discharge Date: 11/7/2024    Discharge Diagnosis: No diagnosis found.    Criteria for Discharge:  Did not return for a second appt     Aftercare recommendations include (include specific referral names and phone numbers, if appropriate):     Prognosis: fair

## 2024-12-25 ENCOUNTER — HOSPITAL ENCOUNTER (EMERGENCY)
Facility: HOSPITAL | Age: 24
Discharge: HOME | End: 2024-12-25
Attending: EMERGENCY MEDICINE
Payer: COMMERCIAL

## 2024-12-25 ENCOUNTER — APPOINTMENT (EMERGENCY)
Dept: CT IMAGING | Facility: HOSPITAL | Age: 24
End: 2024-12-25
Payer: COMMERCIAL

## 2024-12-25 ENCOUNTER — APPOINTMENT (EMERGENCY)
Dept: RADIOLOGY | Facility: HOSPITAL | Age: 24
End: 2024-12-25
Payer: COMMERCIAL

## 2024-12-25 VITALS
TEMPERATURE: 97.5 F | SYSTOLIC BLOOD PRESSURE: 98 MMHG | OXYGEN SATURATION: 100 % | RESPIRATION RATE: 18 BRPM | HEART RATE: 86 BPM | DIASTOLIC BLOOD PRESSURE: 54 MMHG

## 2024-12-25 DIAGNOSIS — R11.2 NAUSEA AND VOMITING: Primary | ICD-10-CM

## 2024-12-25 DIAGNOSIS — R07.9 CHEST PAIN: ICD-10-CM

## 2024-12-25 DIAGNOSIS — R93.89 ABNORMAL CT SCAN: ICD-10-CM

## 2024-12-25 LAB
ALBUMIN SERPL BCG-MCNC: 4.6 G/DL (ref 3.5–5)
ALP SERPL-CCNC: 64 U/L (ref 34–104)
ALT SERPL W P-5'-P-CCNC: 12 U/L (ref 7–52)
ANION GAP SERPL CALCULATED.3IONS-SCNC: 11 MMOL/L (ref 4–13)
AST SERPL W P-5'-P-CCNC: 14 U/L (ref 13–39)
BACTERIA UR QL AUTO: ABNORMAL /HPF
BASOPHILS # BLD MANUAL: 0 THOUSAND/UL (ref 0–0.1)
BASOPHILS NFR MAR MANUAL: 0 % (ref 0–1)
BILIRUB SERPL-MCNC: 0.86 MG/DL (ref 0.2–1)
BILIRUB UR QL STRIP: NEGATIVE
BUN SERPL-MCNC: 15 MG/DL (ref 5–25)
CALCIUM SERPL-MCNC: 9.3 MG/DL (ref 8.4–10.2)
CARDIAC TROPONIN I PNL SERPL HS: <2 NG/L (ref ?–50)
CHLORIDE SERPL-SCNC: 103 MMOL/L (ref 96–108)
CLARITY UR: CLEAR
CO2 SERPL-SCNC: 21 MMOL/L (ref 21–32)
COLOR UR: YELLOW
CREAT SERPL-MCNC: 0.68 MG/DL (ref 0.6–1.3)
D DIMER PPP FEU-MCNC: 0.27 UG/ML FEU
EOSINOPHIL # BLD MANUAL: 0 THOUSAND/UL (ref 0–0.4)
EOSINOPHIL NFR BLD MANUAL: 0 % (ref 0–6)
ERYTHROCYTE [DISTWIDTH] IN BLOOD BY AUTOMATED COUNT: 11.8 % (ref 11.6–15.1)
EXT PREGNANCY TEST URINE: NEGATIVE
EXT. CONTROL: NORMAL
FLUAV AG UPPER RESP QL IA.RAPID: NEGATIVE
FLUBV AG UPPER RESP QL IA.RAPID: NEGATIVE
GFR SERPL CREATININE-BSD FRML MDRD: 124 ML/MIN/1.73SQ M
GLUCOSE SERPL-MCNC: 140 MG/DL (ref 65–140)
GLUCOSE UR STRIP-MCNC: NEGATIVE MG/DL
HCT VFR BLD AUTO: 40.2 % (ref 34.8–46.1)
HGB BLD-MCNC: 14.1 G/DL (ref 11.5–15.4)
HGB UR QL STRIP.AUTO: NEGATIVE
KETONES UR STRIP-MCNC: ABNORMAL MG/DL
LEUKOCYTE ESTERASE UR QL STRIP: NEGATIVE
LIPASE SERPL-CCNC: 24 U/L (ref 11–82)
LYMPHOCYTES # BLD AUTO: 0.99 THOUSAND/UL (ref 0.6–4.47)
LYMPHOCYTES # BLD AUTO: 5 % (ref 14–44)
MAGNESIUM SERPL-MCNC: 1.5 MG/DL (ref 1.9–2.7)
MCH RBC QN AUTO: 32.8 PG (ref 26.8–34.3)
MCHC RBC AUTO-ENTMCNC: 35.1 G/DL (ref 31.4–37.4)
MCV RBC AUTO: 94 FL (ref 82–98)
MONOCYTES # BLD AUTO: 0.79 THOUSAND/UL (ref 0–1.22)
MONOCYTES NFR BLD: 4 % (ref 4–12)
MUCOUS THREADS UR QL AUTO: ABNORMAL
NEUTROPHILS # BLD MANUAL: 17.93 THOUSAND/UL (ref 1.85–7.62)
NEUTS SEG NFR BLD AUTO: 91 % (ref 43–75)
NITRITE UR QL STRIP: NEGATIVE
NON-SQ EPI CELLS URNS QL MICRO: ABNORMAL /HPF
PH UR STRIP.AUTO: 8 [PH]
PLATELET # BLD AUTO: 320 THOUSANDS/UL (ref 149–390)
PLATELET BLD QL SMEAR: ADEQUATE
PMV BLD AUTO: 9.6 FL (ref 8.9–12.7)
POTASSIUM SERPL-SCNC: 3.8 MMOL/L (ref 3.5–5.3)
PROT SERPL-MCNC: 7.2 G/DL (ref 6.4–8.4)
PROT UR STRIP-MCNC: ABNORMAL MG/DL
RBC # BLD AUTO: 4.3 MILLION/UL (ref 3.81–5.12)
RBC #/AREA URNS AUTO: ABNORMAL /HPF
RBC MORPH BLD: NORMAL
SARS-COV+SARS-COV-2 AG RESP QL IA.RAPID: NEGATIVE
SODIUM SERPL-SCNC: 135 MMOL/L (ref 135–147)
SP GR UR STRIP.AUTO: 1.02 (ref 1–1.03)
UROBILINOGEN UR STRIP-ACNC: 2 MG/DL
WBC # BLD AUTO: 19.7 THOUSAND/UL (ref 4.31–10.16)
WBC #/AREA URNS AUTO: ABNORMAL /HPF
WBC TOXIC VACUOLES BLD QL SMEAR: PRESENT

## 2024-12-25 PROCEDURE — 83690 ASSAY OF LIPASE: CPT

## 2024-12-25 PROCEDURE — 81025 URINE PREGNANCY TEST: CPT

## 2024-12-25 PROCEDURE — 85379 FIBRIN DEGRADATION QUANT: CPT

## 2024-12-25 PROCEDURE — 81001 URINALYSIS AUTO W/SCOPE: CPT

## 2024-12-25 PROCEDURE — 71260 CT THORAX DX C+: CPT

## 2024-12-25 PROCEDURE — 36415 COLL VENOUS BLD VENIPUNCTURE: CPT

## 2024-12-25 PROCEDURE — 84484 ASSAY OF TROPONIN QUANT: CPT

## 2024-12-25 PROCEDURE — 87811 SARS-COV-2 COVID19 W/OPTIC: CPT

## 2024-12-25 PROCEDURE — 99285 EMERGENCY DEPT VISIT HI MDM: CPT

## 2024-12-25 PROCEDURE — 83735 ASSAY OF MAGNESIUM: CPT

## 2024-12-25 PROCEDURE — 80053 COMPREHEN METABOLIC PANEL: CPT

## 2024-12-25 PROCEDURE — 96365 THER/PROPH/DIAG IV INF INIT: CPT

## 2024-12-25 PROCEDURE — 74177 CT ABD & PELVIS W/CONTRAST: CPT

## 2024-12-25 PROCEDURE — 93005 ELECTROCARDIOGRAM TRACING: CPT

## 2024-12-25 PROCEDURE — 71045 X-RAY EXAM CHEST 1 VIEW: CPT

## 2024-12-25 PROCEDURE — 96375 TX/PRO/DX INJ NEW DRUG ADDON: CPT

## 2024-12-25 PROCEDURE — 85007 BL SMEAR W/DIFF WBC COUNT: CPT

## 2024-12-25 PROCEDURE — 96361 HYDRATE IV INFUSION ADD-ON: CPT

## 2024-12-25 PROCEDURE — 96366 THER/PROPH/DIAG IV INF ADDON: CPT

## 2024-12-25 PROCEDURE — 85027 COMPLETE CBC AUTOMATED: CPT

## 2024-12-25 PROCEDURE — 87804 INFLUENZA ASSAY W/OPTIC: CPT

## 2024-12-25 RX ORDER — METOCLOPRAMIDE HYDROCHLORIDE 5 MG/ML
10 INJECTION INTRAMUSCULAR; INTRAVENOUS ONCE
Status: COMPLETED | OUTPATIENT
Start: 2024-12-25 | End: 2024-12-25

## 2024-12-25 RX ORDER — METOCLOPRAMIDE 10 MG/1
10 TABLET ORAL EVERY 6 HOURS
Qty: 30 TABLET | Refills: 0 | Status: SHIPPED | OUTPATIENT
Start: 2024-12-25

## 2024-12-25 RX ORDER — KETOROLAC TROMETHAMINE 30 MG/ML
15 INJECTION, SOLUTION INTRAMUSCULAR; INTRAVENOUS ONCE
Status: COMPLETED | OUTPATIENT
Start: 2024-12-25 | End: 2024-12-25

## 2024-12-25 RX ORDER — MAGNESIUM SULFATE HEPTAHYDRATE 40 MG/ML
2 INJECTION, SOLUTION INTRAVENOUS ONCE
Status: COMPLETED | OUTPATIENT
Start: 2024-12-25 | End: 2024-12-25

## 2024-12-25 RX ADMIN — SODIUM CHLORIDE 1000 ML: 0.9 INJECTION, SOLUTION INTRAVENOUS at 15:57

## 2024-12-25 RX ADMIN — KETOROLAC TROMETHAMINE 15 MG: 30 INJECTION, SOLUTION INTRAMUSCULAR; INTRAVENOUS at 15:03

## 2024-12-25 RX ADMIN — MAGNESIUM SULFATE HEPTAHYDRATE 2 G: 40 INJECTION, SOLUTION INTRAVENOUS at 15:51

## 2024-12-25 RX ADMIN — IOHEXOL 100 ML: 350 INJECTION, SOLUTION INTRAVENOUS at 16:20

## 2024-12-25 RX ADMIN — SODIUM CHLORIDE 1000 ML: 0.9 INJECTION, SOLUTION INTRAVENOUS at 14:42

## 2024-12-25 RX ADMIN — METOCLOPRAMIDE 10 MG: 5 INJECTION, SOLUTION INTRAMUSCULAR; INTRAVENOUS at 15:03

## 2024-12-25 NOTE — ED PROVIDER NOTES
Time reflects when diagnosis was documented in both MDM as applicable and the Disposition within this note       Time User Action Codes Description Comment    12/25/2024  5:11 PM Radha Naranjo Add [R11.2] Nausea and vomiting     12/25/2024  5:11 PM Radha Naranjo Add [R93.89] Abnormal CT scan     12/25/2024  5:11 PM Radha Naranjo Add [R07.9] Chest pain           ED Disposition       ED Disposition   Discharge    Condition   Stable    Date/Time   Wed Dec 25, 2024  5:27 PM    Comment   Kala Bernal discharge to home/self care.                   Assessment & Plan       Medical Decision Making  Differential diagnosis including JOE, flu, electrolyte abnormality, dysrhythmia, pneumonia  Will order labs, chest x-ray.  Will provide fluids.   Patient has left sided abdominal pain. With 19 thousand leukocytosis, negative chest xray. Patient had URI and was improving but returned with cough last night. Will check ct c/a/p. Leukocytosis likely reflective to acute vomiting and dehydration, 40+ ketones seen on UA. Second liter of fluids provided. No JOE. Mg replaced.   Heart score of 0. Initial troponin negative. Symptoms started over 3 hours and per [facility] ACS  algorithm no further troponin required. Chest tightness improved with fluids and toradol.   Discussed incidental finding of liver lesions on CT scan. Discussed follow up in outpatient setting with PCP. Aware without appropriate follow up diagnosis such as but not limited to cancer can be missed.   Discussed supportive care at home. Aware no acute finding on CT scan today. PO challenge without vomiting. Will prescribe Reglan for outpatient setting. Discussed bland diet. Discussed return precautions. Patient reports feeling improved.   Reviewed reasons to return to ed.  Patient verbalized understanding of diagnosis and agreement with discharge plan of care as well as understanding of reasons to return to ed.        Amount and/or Complexity of Data  Reviewed  Labs: ordered. Decision-making details documented in ED Course.  Radiology: ordered.    Risk  Prescription drug management.        ED Course as of 12/25/24 1734   Wed Dec 25, 2024   1448 PREGNANCY TEST URINE: Negative   1510 D-Dimer, Quant: 0.27   1510 FLU/COVID Rapid Antigen (30 min. TAT) - Preferred screening test in ED   1725 Patient tolerating PO. Patient reports feeling improved. Discussed bland diet. Discussed return precautions.        Medications   magnesium sulfate 2 g/50 mL IVPB (premix) 2 g (2 g Intravenous New Bag 12/25/24 1551)   sodium chloride 0.9 % bolus 1,000 mL (0 mL Intravenous Stopped 12/25/24 1557)   ketorolac (TORADOL) injection 15 mg (15 mg Intravenous Given 12/25/24 1503)   metoclopramide (REGLAN) injection 10 mg (10 mg Intravenous Given 12/25/24 1503)   sodium chloride 0.9 % bolus 1,000 mL (1,000 mL Intravenous New Bag 12/25/24 1557)   iohexol (OMNIPAQUE) 350 MG/ML injection (MULTI-DOSE) 100 mL (100 mL Intravenous Given 12/25/24 1620)       ED Risk Strat Scores      HEART Risk Score      Flowsheet Row Most Recent Value   Heart Score Risk Calculator    History 0 Filed at: 12/25/2024 1711   ECG 0 Filed at: 12/25/2024 1711   Age 0 Filed at: 12/25/2024 1711   Risk Factors 0 Filed at: 12/25/2024 1711   Troponin 0 Filed at: 12/25/2024 1711   HEART Score 0 Filed at: 12/25/2024 1711                                                History of Present Illness       Chief Complaint   Patient presents with    Vomiting     Pt reports that she has been vomiting since she woke up this morning and hasn't been able to keep anything down. Pt also c/o chest pressure and fevers. Pt denies any abd pain or diarrhea.        Past Medical History:   Diagnosis Date    PCOS (polycystic ovarian syndrome)       Past Surgical History:   Procedure Laterality Date    WISDOM TOOTH EXTRACTION N/A 06/01/2023      Family History   Problem Relation Age of Onset    Thyroid disease Mother     Thyroid disease Father      Thyroid cancer Father     Colin-Danlos syndrome Sister     No Known Problems Sister     No Known Problems Brother       Social History     Tobacco Use    Smoking status: Never    Smokeless tobacco: Never   Vaping Use    Vaping status: Never Used   Substance Use Topics    Alcohol use: Yes     Comment: Only occasionally    Drug use: Yes     Comment: medical marijuana      E-Cigarette/Vaping    E-Cigarette Use Never User       E-Cigarette/Vaping Substances      I have reviewed and agree with the history as documented.     Patient is a 24-year-old female no significant past medical history arriving for evaluation of cough nausea, vomiting that started morning.  Patient reports that she is also having tightness in her chest.  Patient reports that she started with a cough yesterday.  Patient reports that a nonproductive cough.  Patient states that she started throwing up today.  Patient reports that she has not been able to keep anything down.  Patient denies any p.o. birth control.  Patient denies any leg swelling.  Patient denies any urinary symptoms. Patient reporting left sided abdominal pain. Took zofran a 9AM and 1:30 PM with no relief.         Review of Systems   Constitutional: Negative.    HENT: Negative.     Eyes: Negative.    Respiratory:  Positive for shortness of breath.    Cardiovascular:  Positive for chest pain.   Gastrointestinal:  Positive for abdominal pain, nausea and vomiting.   Endocrine: Negative.    Genitourinary: Negative.    Musculoskeletal: Negative.    Skin: Negative.    Allergic/Immunologic: Negative.    Neurological: Negative.    Hematological: Negative.    Psychiatric/Behavioral: Negative.     All other systems reviewed and are negative.          Objective       ED Triage Vitals [12/25/24 1414]   Temperature Pulse Blood Pressure Respirations SpO2 Patient Position - Orthostatic VS   97.5 °F (36.4 °C) 104 114/56 18 100 % Lying      Temp Source Heart Rate Source BP Location FiO2 (%) Pain Score     Temporal Monitor Left arm -- 4      Vitals      Date and Time Temp Pulse SpO2 Resp BP Pain Score FACES Pain Rating User   12/25/24 1503 -- -- -- -- -- 5 -- SS   12/25/24 1445 -- 70 99 % 17 -- -- -- LK   12/25/24 1430 -- 123 99 % 18 101/57 -- --    12/25/24 1414 97.5 °F (36.4 °C) 104 100 % 18 114/56 4 -- RN            Physical Exam  Vitals and nursing note reviewed.   Constitutional:       Appearance: Normal appearance. She is normal weight.   HENT:      Head: Normocephalic.      Right Ear: External ear normal.      Left Ear: External ear normal.      Nose: Nose normal.      Mouth/Throat:      Mouth: Mucous membranes are moist.      Pharynx: Oropharynx is clear.   Eyes:      Extraocular Movements: Extraocular movements intact.      Conjunctiva/sclera: Conjunctivae normal.      Pupils: Pupils are equal, round, and reactive to light.   Cardiovascular:      Rate and Rhythm: Normal rate and regular rhythm.      Pulses: Normal pulses.      Heart sounds: Normal heart sounds.   Pulmonary:      Effort: Pulmonary effort is normal.      Breath sounds: Normal breath sounds.   Abdominal:      General: Abdomen is flat. Bowel sounds are normal. There is no distension.      Palpations: Abdomen is soft. There is no mass.      Tenderness: There is no abdominal tenderness. There is no right CVA tenderness, left CVA tenderness, guarding or rebound.      Hernia: No hernia is present.   Musculoskeletal:         General: Normal range of motion.      Cervical back: Normal range of motion.   Skin:     General: Skin is warm.      Capillary Refill: Capillary refill takes less than 2 seconds.   Neurological:      General: No focal deficit present.      Mental Status: She is alert and oriented to person, place, and time. Mental status is at baseline.   Psychiatric:         Mood and Affect: Mood normal.         Behavior: Behavior normal.         Thought Content: Thought content normal.         Judgment: Judgment normal.         Results  Reviewed       Procedure Component Value Units Date/Time    RBC Morphology Reflex Test [[id number]] Collected: 12/25/24 1439    Lab Status: Final result Specimen: Blood from Arm, Left Updated: 12/25/24 1601    CBC and differential [[id number]]  (Abnormal) Collected: 12/25/24 1439    Lab Status: Final result Specimen: Blood from Arm, Left Updated: 12/25/24 1546     WBC 19.70 Thousand/uL      RBC 4.30 Million/uL      Hemoglobin 14.1 g/dL      Hematocrit 40.2 %      MCV 94 fL      MCH 32.8 pg      MCHC 35.1 g/dL      RDW 11.8 %      MPV 9.6 fL      Platelets 320 Thousands/uL     Narrative:      This is an appended report.  These results have been appended to a previously verified report.    Manual Differential(PHLEBS Do Not Order) [[id number]]  (Abnormal) Collected: 12/25/24 1439    Lab Status: Final result Specimen: Blood from Arm, Left Updated: 12/25/24 1546     Segmented % 91 %      Lymphocytes % 5 %      Monocytes % 4 %      Eosinophils % 0 %      Basophils % 0 %      Absolute Neutrophils 17.93 Thousand/uL      Absolute Lymphocytes 0.99 Thousand/uL      Absolute Monocytes 0.79 Thousand/uL      Absolute Eosinophils 0.00 Thousand/uL      Absolute Basophils 0.00 Thousand/uL      Total Counted --     Toxic Vacuolization Present     RBC Morphology Normal     Platelet Estimate Adequate    HS Troponin 0hr (reflex protocol) [[id number]]  (Normal) Collected: 12/25/24 1439    Lab Status: Final result Specimen: Blood from Arm, Left Updated: 12/25/24 1516     hs TnI 0hr <2 ng/L     FLU/COVID Rapid Antigen (30 min. TAT) - Preferred screening test in ED [[id number]]  (Normal) Collected: 12/25/24 1446    Lab Status: Final result Specimen: Nares from Nose Updated: 12/25/24 1510     SARS COV Rapid Antigen Negative     Influenza A Rapid Antigen Negative     Influenza B Rapid Antigen Negative    Narrative:      This test has been performed using the [employer] [facility] 2 FLU+SARS Antigen test under the Emergency Use Authorization (EUA). This  test has been validated by the  and verified by the performing laboratory. The [employer] uses lateral flow immunofluorescent sandwich assay to detect SARS-COV, Influenza A and Influenza B Antigen.     The [employer] 2 SARS Antigen test does not differentiate between SARS-CoV and SARS-CoV-2.     Negative results are presumptive and may be confirmed with a molecular assay, if necessary, for patient management. Negative results do not rule out SARS-CoV-2 or influenza infection and should not be used as the sole basis for treatment or patient management decisions. A negative test result may occur if the level of antigen in a sample is below the limit of detection of this test.     Positive results are indicative of the presence of viral antigens, but do not rule out bacterial infection or co-infection with other viruses.     All test results should be used as an adjunct to clinical observations and other information available to the provider.    FOR PEDIATRIC PATIENTS - copy/paste COVID Guidelines URL to browser: [email or web address]    [facility] [[id number]] Collected: 12/25/24 1439    Lab Status: Final result Specimen: Blood from Arm, Left Updated: 12/25/24 1509     Sodium 135 mmol/L      Potassium 3.8 mmol/L      Chloride 103 mmol/L      CO2 21 mmol/L      ANION GAP 11 mmol/L      BUN 15 mg/dL      Creatinine 0.68 mg/dL      Glucose 140 mg/dL      Calcium 9.3 mg/dL      AST 14 U/L      ALT 12 U/L      Alkaline Phosphatase 64 U/L      Total Protein 7.2 g/dL      Albumin 4.6 g/dL      Total Bilirubin 0.86 mg/dL      eGFR 124 ml/min/1.73sq m     Narrative:      National Kidney Disease Foundation guidelines for Chronic Kidney Disease (CKD):     Stage 1 with normal or high GFR (GFR > 90 mL/min/1.73 square meters)    Stage 2 Mild CKD (GFR = 60-89 mL/min/1.73 square meters)    Stage 3A Moderate CKD (GFR = 45-59 mL/min/1.73 square meters)    Stage 3B Moderate CKD (GFR  = 30-44 mL/min/1.73 square meters)    Stage 4 Severe CKD (GFR = 15-29 mL/min/1.73 square meters)    Stage 5 End Stage CKD (GFR <15 mL/min/1.73 square meters)  Note: GFR calculation is accurate only with a steady state creatinine    Lipase [[id number]]  (Normal) Collected: 12/25/24 1439    Lab Status: Final result Specimen: Blood from Arm, Left Updated: 12/25/24 1509     Lipase 24 u/L     Magnesium [[id number]]  (Abnormal) Collected: 12/25/24 1439    Lab Status: Final result Specimen: Blood from Arm, Left Updated: 12/25/24 1509     Magnesium 1.5 mg/dL     D-dimer, quantitative [[id number]]  (Normal) Collected: 12/25/24 1446    Lab Status: Final result Specimen: Blood from Arm, Left Updated: 12/25/24 1509     D-Dimer, Quant 0.27 ug/ml FEU     Urine Microscopic [[id number]]  (Abnormal) Collected: 12/25/24 1442    Lab Status: Final result Specimen: Urine, Clean Catch Updated: 12/25/24 1506     RBC, UA 0-1 /hpf      WBC, UA 0-1 /hpf      Epithelial Cells Occasional /hpf      Bacteria, UA Occasional /hpf      MUCUS THREADS Occasional    UA w Reflex to Microscopic w Reflex to Culture [[id number]]  (Abnormal) Collected: 12/25/24 1442    Lab Status: Final result Specimen: Urine, Clean Catch Updated: 12/25/24 1500     Color, UA Yellow     Clarity, UA Clear     Specific Gravity, UA 1.020     pH, UA 8.0     Leukocytes, UA Negative     Nitrite, UA Negative     Protein, UA Trace mg/dl      Glucose, UA Negative mg/dl      Ketones, UA 40 (2+) mg/dl      Urobilinogen, UA 2.0 mg/dl      Bilirubin, UA Negative     Occult Blood, UA Negative    POCT pregnancy, urine [[id number]]  (Normal) Collected: 12/25/24 1443    Lab Status: Final result Specimen: Urine Updated: 12/25/24 1446     EXT Preg Test, Ur Negative     Control Valid            CT chest abdomen pelvis w contrast   Final Interpretation by Bebeto Lundy MD (12/25 1658)      No acute findings in the chest, abdomen or pelvis. Clear lungs.      Two hypodense lesions within the  liver, incompletely characterized in this exam, for which nonemergent contrast-enhanced MRI of the abdomen is recommended for further evaluation      The study was marked in EPIC for immediate notification.               Workstation performed: [id number]         X-ray chest 1 view portable   Final Interpretation by Kel Oliver MD (12/25 2857)      No acute cardiopulmonary disease.            Workstation performed: [id number]             ECG 12 Lead Documentation Only    Date/Time: 12/25/2024 2:35 PM    Performed by: SMILEY Skaggs  Authorized by: SMILEY Skaggs    Indications / Diagnosis:  Chest tightness  ECG reviewed by me, the ED Provider: yes    Patient location:  ED  Interpretation:     Interpretation: normal    Rate:     ECG rate:  83    ECG rate assessment: normal    Rhythm:     Rhythm: sinus rhythm    Ectopy:     Ectopy: none    QRS:     QRS axis:  Normal  Conduction:     Conduction: normal    ST segments:     ST segments:  Normal  T waves:     T waves: normal        ED Medication and Procedure Management   Prior to Admission Medications   Prescriptions Last Dose Informant Patient Reported? Taking?   DULoxetine (CYMBALTA) 30 mg delayed release capsule   No No   Sig: Take 1 capsule (30 mg total) by mouth daily   hydrOXYzine HCL (ATARAX) 10 mg tablet   No No   Sig: Take 1 tablet (10 mg total) by mouth every 6 (six) hours as needed for anxiety      Facility-Administered Medications: None     Patient's Medications   Discharge Prescriptions    METOCLOPRAMIDE (REGLAN) 10 MG TABLET    Take 1 tablet (10 mg total) by mouth every 6 (six) hours       Start Date: 12/25/2024End Date: --       Order Dose: 10 mg       Quantity: 30 tablet    Refills: 0     No discharge procedures on file.  ED SEPSIS DOCUMENTATION   Time reflects when diagnosis was documented in both MDM as applicable and the Disposition within this note       Time User Action Codes Description Comment    12/25/2024  5:11 PM Radha Naranjo  M Add [R11.2] Nausea and vomiting     12/25/2024  5:11 PM Radha Naranjo [R93.89] Abnormal CT scan     12/25/2024  5:11 PM Radha Naranjo [R07.9] Chest pain                  SMILEY Skaggs  12/25/24 0707

## 2024-12-25 NOTE — DISCHARGE INSTRUCTIONS
"Follow up with PCP for \"hypodense lesions within the liver incompletely characterized in this exam, for which nonemergent contrast-enhanced MRI of the abdomen is recommended for further evaluation\" without appropriate follow up diagnosis such as but not limited to cancer can be missed.   "

## 2024-12-26 ENCOUNTER — VBI (OUTPATIENT)
Dept: FAMILY MEDICINE CLINIC | Facility: HOSPITAL | Age: 24
End: 2024-12-26

## 2024-12-26 NOTE — TELEPHONE ENCOUNTER
12/26/24 10:04 AM    Patient contacted post ED visit, [facility] department spoke with patient/caregiver and outreach was successful.    Thank you.  Sigrid Christiansen MA  [facility]

## 2024-12-27 LAB
ATRIAL RATE: 83 BPM
P AXIS: 69 DEGREES
PR INTERVAL: 130 MS
QRS AXIS: 87 DEGREES
QRSD INTERVAL: 84 MS
QT INTERVAL: 356 MS
QTC INTERVAL: 418 MS
T WAVE AXIS: 51 DEGREES
VENTRICULAR RATE: 83 BPM

## 2024-12-27 PROCEDURE — 93010 ELECTROCARDIOGRAM REPORT: CPT | Performed by: INTERNAL MEDICINE

## 2025-01-03 ENCOUNTER — TELEPHONE (OUTPATIENT)
Age: 25
End: 2025-01-03

## 2025-01-03 NOTE — TELEPHONE ENCOUNTER
Patient called asking if her hospital note stating the results of her ER visit on 12/25/24, be faxed to her gynecologist at  [phone].

## 2025-01-10 ENCOUNTER — OFFICE VISIT (OUTPATIENT)
Dept: FAMILY MEDICINE CLINIC | Facility: HOSPITAL | Age: 25
End: 2025-01-10
Payer: COMMERCIAL

## 2025-01-10 VITALS
OXYGEN SATURATION: 98 % | HEIGHT: 63 IN | SYSTOLIC BLOOD PRESSURE: 114 MMHG | HEART RATE: 88 BPM | DIASTOLIC BLOOD PRESSURE: 80 MMHG | WEIGHT: 121.2 LBS | BODY MASS INDEX: 21.48 KG/M2

## 2025-01-10 DIAGNOSIS — R93.2 ABNORMAL CT OF LIVER: ICD-10-CM

## 2025-01-10 DIAGNOSIS — Z00.00 ANNUAL PHYSICAL EXAM: Primary | ICD-10-CM

## 2025-01-10 DIAGNOSIS — F32.9 REACTIVE DEPRESSION: ICD-10-CM

## 2025-01-10 DIAGNOSIS — F33.41 RECURRENT MAJOR DEPRESSIVE DISORDER, IN PARTIAL REMISSION (HCC): ICD-10-CM

## 2025-01-10 DIAGNOSIS — E55.9 VITAMIN D INSUFFICIENCY: ICD-10-CM

## 2025-01-10 DIAGNOSIS — R79.89 ELEVATED DEHYDROEPIANDROSTERONE (DHEA) LEVEL: ICD-10-CM

## 2025-01-10 DIAGNOSIS — F41.1 GAD (GENERALIZED ANXIETY DISORDER): ICD-10-CM

## 2025-01-10 DIAGNOSIS — E28.2 PCOS (POLYCYSTIC OVARIAN SYNDROME): ICD-10-CM

## 2025-01-10 PROBLEM — F32.2 SEVERE MAJOR DEPRESSIVE DISORDER (HCC): Status: ACTIVE | Noted: 2025-01-10

## 2025-01-10 PROCEDURE — 99395 PREV VISIT EST AGE 18-39: CPT | Performed by: NURSE PRACTITIONER

## 2025-01-10 RX ORDER — DULOXETIN HYDROCHLORIDE 30 MG/1
30 CAPSULE, DELAYED RELEASE ORAL DAILY
Qty: 90 CAPSULE | Refills: 3 | Status: SHIPPED | OUTPATIENT
Start: 2025-01-10

## 2025-01-10 NOTE — ASSESSMENT & PLAN NOTE
KEN score 3 today.  Overall anxiety much improved with Cymbalta 30 mg p.o. daily.  Continues to use hydroxyzine for rare panic attack.  Continues to smoke MMJ-I have requested she find an alternative to smoking such as tincture or gummy.  Is not currently in therapeutic services which I continue to encourage.

## 2025-01-10 NOTE — PATIENT INSTRUCTIONS
Please get updated eye exam.  Please obtain GYN records including PAP  Check vitamin D and iron panel with next labwork.

## 2025-01-10 NOTE — ASSESSMENT & PLAN NOTE
Was in the emergency room on 12/25/2020 with what appears to be a viral gastroenteritis.  She underwent a CAT scan A/P with incidental finding of focal hypodensity in the posterior left hepatic lobe measuring approximately 2.8 x 1 cm as well as an additional hypodensity along the hepatic fissure measuring approximately 0.5 x 0.8 cm.    -She denies any unexplained weight loss, jaundice, abdominal bloating, early satiety, fatigue or pruritus.  Is agreeable to nonemergent MRI to explore incidental findings further.

## 2025-01-10 NOTE — ASSESSMENT & PLAN NOTE
History of PCOS followed by Dr. Daniel [facility].  Reports recent Pap, requesting records.  12/26/24 lab work with elevated DHEA at 436 was suggested to be referred to endocrinology.  Referral in place.

## 2025-01-10 NOTE — ASSESSMENT & PLAN NOTE
PHQ-2/9 Depression Screening    Little interest or pleasure in doing things: 1 - several days  Feeling down, depressed, or hopeless: 0 - not at all  Trouble falling or staying asleep, or sleeping too much: 1 - several days  Feeling tired or having little energy: 1 - several days  Poor appetite or overeatin - not at all  Feeling bad about yourself - or that you are a failure or have let yourself or your family down: 1 - several days  Trouble concentrating on things, such as reading the newspaper or watching television: 0 - not at all  Moving or speaking so slowly that other people could have noticed. Or the opposite - being so fidgety or restless that you have been moving around a lot more than usual: 0 - not at all  Thoughts that you would be better off dead, or of hurting yourself in some way: 0 - not at all  PHQ-9 Score: 4  PHQ-9 Interpretation: No or Minimal depression       History of recurrent MDD well-controlled with Cymbalta 30 mg p.o. daily.  Not currently in therapy which I continue to encourage.  She is optimizing her hydration nutrition and physical activity and stress reduction.

## 2025-01-10 NOTE — PROGRESS NOTES
History of Present Illness   Well Adult Physical   Patient here for a comprehensive physical exam.    Here for physical and to discuss incidental findings on CAT scan completed in the ER on 2024.    Did have recent GYN OV with PAP.  Need records.  Granted access to recent labwork Dr. Daniel had ordered:     24    TSH 0.8  A1C 5.3  HCG <1  FSH 4.8  Testosterone 15.9 with free testosterone 1.5  Dehydroepiandrosterone sulfate 436            Diet and Physical Activity  Diet: well balanced diet  Weight concerns: None, patient's BMI is between 18.5-24.9  Exercise: several times per week   EtOH: rare/occasional/daily/social/none: occasional     Depression Screen  PHQ-2/9 Depression Screening    Little interest or pleasure in doing things: 1 - several days  Feeling down, depressed, or hopeless: 0 - not at all  Trouble falling or staying asleep, or sleeping too much: 1 - several days  Feeling tired or having little energy: 1 - several days  Poor appetite or overeatin - not at all  Feeling bad about yourself - or that you are a failure or have let yourself or your family down: 1 - several days  Trouble concentrating on things, such as reading the newspaper or watching television: 0 - not at all  Moving or speaking so slowly that other people could have noticed. Or the opposite - being so fidgety or restless that you have been moving around a lot more than usual: 0 - not at all  Thoughts that you would be better off dead, or of hurting yourself in some way: 0 - not at all  PHQ-9 Score: 4  PHQ-9 Interpretation: No or Minimal depression          General Health  Hearing: Normal:  bilateral  Vision: most recent eye exam >1 year  Dental: regular dental visits     History:  LMP: last week.  Typically gets menses 2x yearly  : 0  Para: 0    Cancer Screening  Colononoscopy N/A  Mammogram N/A   Pap   Abnormal pap? no  Smoker no tobacco use; current marijuana smoker Annual screening with low-dose helical  computed tomography (CT) for patients age 55 to 74 years with history of smoking at least 30 pack-years and, if a former smoker, had quit within the previous 15 years         The following portions of the patient's history were reviewed and updated as appropriate: allergies, current medications, past family history, past medical history, past social history, past surgical history and problem list.    Review of Systems     Review of Systems   Constitutional: Negative.  Negative for activity change, appetite change, chills, fatigue and fever.   HENT: Negative.  Negative for congestion, ear pain, postnasal drip, sinus pain and trouble swallowing.    Eyes: Negative.  Negative for visual disturbance.   Respiratory: Negative.  Negative for cough, chest tightness, shortness of breath and wheezing.    Cardiovascular: Negative.  Negative for chest pain and leg swelling.   Gastrointestinal: Negative.  Negative for constipation and diarrhea.   Endocrine: Negative.    Genitourinary: Negative.  Negative for difficulty urinating and dysuria.   Musculoskeletal: Negative.  Negative for arthralgias and gait problem.   Skin: Negative.  Negative for color change.   Allergic/Immunologic: Negative.  Negative for immunocompromised state.   Neurological: Negative.  Negative for dizziness, weakness, light-headedness and headaches.   Hematological: Negative.    Psychiatric/Behavioral: Negative.  Negative for dysphoric mood and sleep disturbance. The patient is not nervous/anxious.        Past Medical History     Past Medical History:   Diagnosis Date    PCOS (polycystic ovarian syndrome)        Past Surgical History     Past Surgical History:   Procedure Laterality Date    WISDOM TOOTH EXTRACTION N/A 06/01/2023       Social History     Social History     Socioeconomic History    Marital status: Single     Spouse name: None    Number of children: None    Years of education: None    Highest education level: None   Occupational History    None  "  Tobacco Use    Smoking status: Never    Smokeless tobacco: Never   Vaping Use    Vaping status: Never Used   Substance and Sexual Activity    Alcohol use: Yes     Comment: Only occasionally    Drug use: Yes     Comment: medical marijuana    Sexual activity: Yes     Birth control/protection: Condom Male   Other Topics Concern    None   Social History Narrative    None     Social Drivers of Health     Financial Resource Strain: Not on file   Food Insecurity: Not on file   Transportation Needs: Not on file   Physical Activity: Not on file   Stress: Not on file   Social Connections: Not on file   Intimate Partner Violence: Not on file   Housing Stability: Not on file       Family History     Family History   Problem Relation Age of Onset    Thyroid disease Mother     Thyroid disease Father     Thyroid cancer Father     Colin-Danlos syndrome Sister     No Known Problems Sister     No Known Problems Brother        Current Medications       Current Outpatient Medications:     DULoxetine (CYMBALTA) 30 mg delayed release capsule, Take 1 capsule (30 mg total) by mouth daily, Disp: 90 capsule, Rfl: 3    hydrOXYzine HCL (ATARAX) 10 mg tablet, Take 1 tablet (10 mg total) by mouth every 6 (six) hours as needed for anxiety, Disp: 30 tablet, Rfl: 0    metoclopramide (Reglan) 10 mg tablet, Take 1 tablet (10 mg total) by mouth every 6 (six) hours, Disp: 30 tablet, Rfl: 0     Allergies     No Known Allergies    Objective     /80   Pulse 88   Ht 5' 3\" (1.6 m)   Wt 55 kg (121 lb 3.2 oz)   SpO2 98%   BMI 21.47 kg/m²   Wt Readings from Last 3 Encounters:   01/10/25 55 kg (121 lb 3.2 oz)   09/06/24 51.3 kg (113 lb)   07/22/24 51.3 kg (113 lb)     BP Readings from Last 3 Encounters:   01/10/25 114/80   12/25/24 98/54   07/22/24 90/56     Pulse Readings from Last 3 Encounters:   01/10/25 88   12/25/24 86   07/22/24 76     Body mass index is 21.47 kg/m².     Physical Exam  Vitals and nursing note reviewed.   Constitutional:      "  Appearance: Normal appearance.   HENT:      Head: Normocephalic and atraumatic.      Right Ear: Tympanic membrane, ear canal and external ear normal.      Left Ear: Tympanic membrane, ear canal and external ear normal.      Nose: Nose normal.      Mouth/Throat:      Mouth: Mucous membranes are moist.      Pharynx: Oropharynx is clear.   Eyes:      Extraocular Movements: Extraocular movements intact.      Conjunctiva/sclera: Conjunctivae normal.      Pupils: Pupils are equal, round, and reactive to light.   Cardiovascular:      Rate and Rhythm: Normal rate and regular rhythm.      Pulses: Normal pulses.      Heart sounds: Normal heart sounds.   Pulmonary:      Effort: Pulmonary effort is normal.      Breath sounds: Normal breath sounds.   Abdominal:      General: Bowel sounds are normal.      Palpations: Abdomen is soft.   Musculoskeletal:         General: Normal range of motion.      Cervical back: Normal range of motion and neck supple.   Skin:     General: Skin is warm and dry.      Coloration: Skin is not jaundiced.      Findings: No bruising.   Neurological:      General: No focal deficit present.      Mental Status: She is alert and oriented to person, place, and time.      Cranial Nerves: Cranial nerves 2-12 are intact.      Sensory: Sensation is intact.      Motor: Motor function is intact.      Coordination: Coordination is intact.      Gait: Gait is intact.   Psychiatric:         Attention and Perception: Attention and perception normal.         Mood and Affect: Mood normal.         Speech: Speech normal.         Behavior: Behavior normal. Behavior is cooperative.         Thought Content: Thought content normal.         Cognition and Memory: Cognition and memory normal.         Judgment: Judgment normal.           No results found.    Health Maintenance     Health Maintenance   Topic Date Due    HPV Vaccine (1 - 3-dose series) Never done    Meningococcal B Vaccine (1 of 2 - Standard) Never done     "Chlamydia Screening  Never done    DTaP,Tdap,and Td Vaccines (1 - Tdap) Never done    Cervical Cancer Screening  Never done    Influenza Vaccine (1) Never done    COVID-19 Vaccine (2 - 2024-25 season) 09/01/2024    Annual Physical  01/02/2025    Depression Screening  09/06/2025    Depression Follow-up Plan  09/06/2025    Zoster Vaccine (1 of 2) 10/20/2052    RSV Vaccine Age 60+ Years (1 - 1-dose 75+ series) 10/20/2077    HIV Screening  Completed    Hepatitis C Screening  Completed    RSV Vaccine age 0-20 Months  Aged Out    Pneumococcal Vaccine: Pediatrics (0 to 5 Years) and At-Risk Patients (6 to 64 Years)  Aged Out    HIB Vaccine  Aged Out    IPV Vaccine  Aged Out    Hepatitis A Vaccine  Aged Out    Meningococcal ACWY Vaccine  Aged Out     Immunization History   Administered Date(s) Administered    COVID-19 J&J (RECOMBINETICS) vaccine 0.5 mL 04/27/2021    Hepatitis B, recombinant, CpG, adjuvanted 03/07/2024       Laboratory Results:   Lab Results   Component Value Date    WBC 19.70 (H) 12/25/2024    WBC 5.9 12/04/2023    HGB 14.1 12/25/2024    HGB 12.5 12/04/2023    HCT 40.2 12/25/2024    HCT 36.7 12/04/2023    MCV 94 12/25/2024    MCV 95 12/04/2023     12/25/2024     12/04/2023     Lab Results   Component Value Date    BUN 15 12/25/2024    BUN 10 12/04/2023     Lab Results   Component Value Date    GLUC 140 12/25/2024    GLUC 90 12/04/2023    ALT 12 12/25/2024    ALT 7 12/04/2023    AST 14 12/25/2024    AST 11 12/04/2023     Lab Results   Component Value Date    TSH 0.930 09/09/2024    TSH 0.467 12/04/2023     No results found for: \"A1C\"    Lipid Profile:   No results found for: \"CHOL\"  Lab Results   Component Value Date    HDL 56 12/04/2023     No results found for: \"LDLC\"  Lab Results   Component Value Date    LDLCALC 64 12/04/2023     Lab Results   Component Value Date    TRIG 29 12/04/2023         Assessment/Plan   1. Annual physical exam  2. Abnormal CT of liver  Assessment & Plan:  Was in the " emergency room on 2020 with what appears to be a viral gastroenteritis.  She underwent a CAT scan A/P with incidental finding of focal hypodensity in the posterior left hepatic lobe measuring approximately 2.8 x 1 cm as well as an additional hypodensity along the hepatic fissure measuring approximately 0.5 x 0.8 cm.    -She denies any unexplained weight loss, jaundice, abdominal bloating, early satiety, fatigue or pruritus.  Is agreeable to nonemergent MRI to explore incidental findings further.  Orders:  -     MRI abdomen w contrast; Future; Expected date: 01/10/2025  3. Recurrent major depressive disorder, in partial remission (HCC)  Assessment & Plan:  PHQ-2/9 Depression Screening    Little interest or pleasure in doing things: 1 - several days  Feeling down, depressed, or hopeless: 0 - not at all  Trouble falling or staying asleep, or sleeping too much: 1 - several days  Feeling tired or having little energy: 1 - several days  Poor appetite or overeatin - not at all  Feeling bad about yourself - or that you are a failure or have let yourself or your family down: 1 - several days  Trouble concentrating on things, such as reading the newspaper or watching television: 0 - not at all  Moving or speaking so slowly that other people could have noticed. Or the opposite - being so fidgety or restless that you have been moving around a lot more than usual: 0 - not at all  Thoughts that you would be better off dead, or of hurting yourself in some way: 0 - not at all  PHQ-9 Score: 4  PHQ-9 Interpretation: No or Minimal depression       History of recurrent MDD well-controlled with Cymbalta 30 mg p.o. daily.  Not currently in therapy which I continue to encourage.  She is optimizing her hydration nutrition and physical activity and stress reduction.  4. KEN (generalized anxiety disorder)  Assessment & Plan:  KEN score 3 today.  Overall anxiety much improved with Cymbalta 30 mg p.o. daily.  Continues to use  hydroxyzine for rare panic attack.  Continues to smoke MMJ-I have requested she find an alternative to smoking such as tincture or gummy.  Is not currently in therapeutic services which I continue to encourage.  Orders:  -     DULoxetine (CYMBALTA) 30 mg delayed release capsule; Take 1 capsule (30 mg total) by mouth daily  5. Elevated dehydroepiandrosterone (DHEA) level  -     Ambulatory Referral to Endocrinology; Future  6. PCOS (polycystic ovarian syndrome)  Assessment & Plan:  History of PCOS followed by Dr. Daniel [facility].  Reports recent Pap, requesting records.  12/26/24 lab work with elevated DHEA at 436 was suggested to be referred to endocrinology.  Referral in place.  Orders:  -     Fe+TIBC+Sonu; Future  -     Fe+TIBC+Sonu  7. Vitamin D insufficiency  Assessment & Plan:  Vitamin D insufficiency.  Will recheck level.  Orders:  -     Vitamin D 25 hydroxy; Future  -     Vitamin D 25 hydroxy  8. Reactive depression  -     DULoxetine (CYMBALTA) 30 mg delayed release capsule; Take 1 capsule (30 mg total) by mouth daily      1. Healthy female exam.  2. Patient Counseling:   Nutrition: Stressed importance of a well balanced diet, moderation of sodium/saturated fat, caloric balance and sufficient intake of fiber  Exercise: Stressed the importance of regular exercise with a goal of 150 minutes per week  Dental Health: Discussed daily flossing and brushing and regular dental visits   Sexuality: Discussed sexually transmitted infections, use of condoms and prevention of unintended pregnancy  Alcohol Use:  Recommended moderation of alcohol intake  Injury Prevention: Discussed Safety Belts, Safety Helmets, and Smoke Detectors    Immunizations reviewed. Declines flu shot.   Discussed benefits of screening .  Discussed the patient's BMI with her.  The BMI is in the acceptable range  3. Cancer Screening   4. Labs Check vitamin D and iron panel with next labwork.   5. Please get updated eye exam.  Please obtain GYN records  including PAP  6. Follow up next physical in 1 year.    SMILEY Guerrero

## 2025-01-31 DIAGNOSIS — R93.2 ABNORMAL CT OF LIVER: Primary | ICD-10-CM

## 2025-02-03 ENCOUNTER — HOSPITAL ENCOUNTER (OUTPATIENT)
Dept: MRI IMAGING | Facility: HOSPITAL | Age: 25
Discharge: HOME | End: 2025-02-03
Payer: COMMERCIAL

## 2025-02-03 DIAGNOSIS — R93.2 ABNORMAL CT OF LIVER: ICD-10-CM

## 2025-02-03 PROCEDURE — A9585 GADOBUTROL INJECTION: HCPCS | Performed by: NURSE PRACTITIONER

## 2025-02-03 PROCEDURE — 74183 MRI ABD W/O CNTR FLWD CNTR: CPT

## 2025-02-03 RX ORDER — GADOBUTROL 604.72 MG/ML
6 INJECTION INTRAVENOUS
Status: COMPLETED | OUTPATIENT
Start: 2025-02-03 | End: 2025-02-03

## 2025-02-03 RX ADMIN — GADOBUTROL 6 ML: 604.72 INJECTION INTRAVENOUS at 20:20

## 2025-02-09 ENCOUNTER — RESULTS FOLLOW-UP (OUTPATIENT)
Dept: FAMILY MEDICINE CLINIC | Facility: HOSPITAL | Age: 25
End: 2025-02-09

## 2025-05-30 ENCOUNTER — OFFICE VISIT (OUTPATIENT)
Dept: ENDOCRINOLOGY | Facility: HOSPITAL | Age: 25
End: 2025-05-30
Payer: COMMERCIAL

## 2025-05-30 VITALS
SYSTOLIC BLOOD PRESSURE: 114 MMHG | WEIGHT: 122.4 LBS | BODY MASS INDEX: 21.69 KG/M2 | DIASTOLIC BLOOD PRESSURE: 80 MMHG | HEIGHT: 63 IN | HEART RATE: 69 BPM

## 2025-05-30 DIAGNOSIS — R79.89 ELEVATED DEHYDROEPIANDROSTERONE (DHEA) LEVEL: ICD-10-CM

## 2025-05-30 DIAGNOSIS — E28.2 PCOS (POLYCYSTIC OVARIAN SYNDROME): Primary | ICD-10-CM

## 2025-05-30 PROCEDURE — 99204 OFFICE O/P NEW MOD 45 MIN: CPT | Performed by: STUDENT IN AN ORGANIZED HEALTH CARE EDUCATION/TRAINING PROGRAM

## 2025-05-30 NOTE — PROGRESS NOTES
Name: Kala Bernal      : 2000      MRN: [id number]  Encounter Provider: Cintia Wiseman MD  Encounter Date: 2025   Encounter department: [facility]    No chief complaint on file.  :  Assessment & Plan    Patient is a 24yF with ? Who presents today for elevated DHEA-s     1) Elevated DHEA-s: has symptoms of mild hirsutism  Discussed given levels not >700, not a concern for adrenal carcinoma. Did discussed reasons for elevated DHEA-s includes PCOS. Other reasons could also be d/t omega 3 fatty acid or also idiopathic. Discussed will trend for now and if Testosterone also elevated can consider aldactone. Will also check cortisol PM as well to make sure does not have other adrenal pathology     2) irregular menses- does meet criteria for PCOS. Has irregular menses 3-6 months, also has clinical symptoms of mild hirsutism and also elevated DHEA-s and hence most likely has lean PCOS. Discussed will check other labs to rule out mimickers  Discussed PCOS management are symptoms based   Menses- managed by Ob  Hyperandrogenism- will repeat labs, discussed aldactone use, can try if symptoms worse or if labs trending higher  Fertility- not a concern   Will check A1C and lipid, no weight or BP issues    3) Excessive sweating- will check thyroid and plasma meta for completion.     RTC in 6 months       Pertinent Medical History     History of Present Illness     Kala Bernal is a 24 y.o. female with PMHx of ? Referred to us for elevated DHEA-s noted on labs checked by her Ob/Gyn.       Menses- reports started menses at age 15, was only getting menses once a year until 19yrs and after that started having menses every 2 months. But within the past 2 years now having menses every 3 months or 6 months.   Did see Ob and did discuss OCP but waiting on her US to discuss this further   Was diagnosed with PCOS but reports maybe not official.   Hyperandrogenism- does  report hair growth on face- shaves once a week. Does report acne arround jaw line. Has never been on any medication for this  Supplements- does take omega, VitD and iron   Fertility problems- none  Metabolic issues- reports lost 40lbs over 1.5yrs in , after working on lifestyle changes   Family Hx- neg for PCOS, mother ?has adrenal issues  Does report hair loss and also issues with excessive sweating, does report upset stomach and also bloating, also reports diarrhea.     Social hx- has a medical marijuana card, occasional alcohol use, no other drugs.     Review of Systems   Constitutional:  Negative for diaphoresis, fatigue and unexpected weight change.   Eyes:  Negative for photophobia and visual disturbance.   Gastrointestinal:  Negative for constipation, diarrhea and vomiting.   Endocrine: Negative for polydipsia and polyuria.   Skin: Negative.     as per HPI       Medical History Reviewed by provider this encounter:     .    Objective   There were no vitals taken for this visit.     There is no height or weight on file to calculate BMI.  Wt Readings from Last 3 Encounters:   01/10/25 55 kg (121 lb 3.2 oz)   24 51.3 kg (113 lb)   24 51.3 kg (113 lb)     Physical Exam  Constitutional:       Appearance: Normal appearance. She is normal weight.     Cardiovascular:      Rate and Rhythm: Normal rate.   Pulmonary:      Effort: Pulmonary effort is normal.   Abdominal:      General: Bowel sounds are normal.      Palpations: Abdomen is soft.     Skin:     General: Skin is warm.      Capillary Refill: Capillary refill takes less than 2 seconds.     Neurological:      General: No focal deficit present.      Mental Status: She is alert.     Psychiatric:         Mood and Affect: Mood normal.         Labs: I have reviewed pertinent labs includin24    TSH 0.8  A1C 5.3  HCG <1  FSH 4.8  Testosterone 15.9 with free testosterone 1.5  Dehydroepiandrosterone sulfate 436    There are no Patient Instructions  on file for this visit.    Discussed with the patient and all questioned fully answered. She will call me if any problems arise.

## 2025-06-29 LAB
DHEA-S SERPL-MCNC: 319 UG/DL (ref 110–431.7)
PROLACTIN SERPL-MCNC: 15.9 NG/ML (ref 4.8–33.4)

## 2025-07-09 LAB
CHOLEST SERPL-MCNC: 127 MG/DL (ref 100–199)
CHOLEST/HDLC SERPL: 2.4 RATIO (ref 0–4.4)
EST. AVERAGE GLUCOSE BLD GHB EST-MCNC: 97 MG/DL
HBA1C MFR BLD: 5 % (ref 4.8–5.6)
HDLC SERPL-MCNC: 54 MG/DL
LDLC SERPL CALC-MCNC: 64 MG/DL (ref 0–99)
METANEPH FREE SERPL-MCNC: 38.7 PG/ML (ref 0–88)
NORMETANEPHRINE SERPL-MCNC: 77.5 PG/ML (ref 0–210.1)
SL AMB VLDL CHOLESTEROL CALC: 9 MG/DL (ref 5–40)
T4 FREE SERPL-MCNC: 1.32 NG/DL (ref 0.82–1.77)
TESTOST FREE SERPL-MCNC: 1.1 PG/ML (ref 0–4.2)
TESTOST SERPL-MCNC: 34 NG/DL (ref 13–71)
TRIGL SERPL-MCNC: 36 MG/DL (ref 0–149)
TSH SERPL DL<=0.005 MIU/L-ACNC: 0.41 UIU/ML (ref 0.45–4.5)

## 2025-07-10 DIAGNOSIS — R79.89 LOW TSH LEVEL: Primary | ICD-10-CM

## 2025-07-14 DIAGNOSIS — R79.89 ELEVATED DEHYDROEPIANDROSTERONE (DHEA) LEVEL: ICD-10-CM

## 2025-07-14 DIAGNOSIS — E28.2 PCOS (POLYCYSTIC OVARIAN SYNDROME): ICD-10-CM

## 2025-07-14 DIAGNOSIS — R79.89 LOW TSH LEVEL: Primary | ICD-10-CM

## 2025-07-21 ENCOUNTER — APPOINTMENT (OUTPATIENT)
Dept: URBAN - METROPOLITAN AREA CLINIC 31 | Facility: CLINIC | Age: 25
Setting detail: DERMATOLOGY
End: 2025-07-21

## 2025-07-21 DIAGNOSIS — L70.0 ACNE VULGARIS: ICD-10-CM

## 2025-07-21 DIAGNOSIS — L74.51 PRIMARY FOCAL HYPERHIDROSIS: ICD-10-CM | Status: INADEQUATELY CONTROLLED

## 2025-07-21 PROBLEM — L74.510 PRIMARY FOCAL HYPERHIDROSIS, AXILLA: Status: ACTIVE | Noted: 2025-07-21

## 2025-07-21 PROCEDURE — ? PRESCRIPTION

## 2025-07-21 PROCEDURE — ? PRESCRIPTION MEDICATION MANAGEMENT

## 2025-07-21 PROCEDURE — ? COUNSELING

## 2025-07-21 RX ORDER — TRETIONIN 0.25 MG/G
1 CREAM TOPICAL QHS
Qty: 45 | Refills: 10 | Status: ERX | COMMUNITY
Start: 2025-07-21

## 2025-07-21 RX ORDER — GLYCOPYRRONIUM 2.4 G/100G
1 CLOTH TOPICAL
Qty: 30 | Refills: 10 | Status: ERX | COMMUNITY
Start: 2025-07-21

## 2025-07-21 RX ADMIN — GLYCOPYRRONIUM 1: 2.4 CLOTH TOPICAL at 00:00

## 2025-07-21 RX ADMIN — TRETIONIN 1: 0.25 CREAM TOPICAL at 00:00

## 2025-07-21 ASSESSMENT — LOCATION SIMPLE DESCRIPTION DERM
LOCATION SIMPLE: LEFT AXILLARY VAULT
LOCATION SIMPLE: LEFT CHEEK
LOCATION SIMPLE: RIGHT AXILLARY VAULT

## 2025-07-21 ASSESSMENT — LOCATION DETAILED DESCRIPTION DERM
LOCATION DETAILED: LEFT AXILLARY VAULT
LOCATION DETAILED: RIGHT AXILLARY VAULT
LOCATION DETAILED: LEFT INFERIOR CENTRAL MALAR CHEEK

## 2025-07-21 ASSESSMENT — LOCATION ZONE DERM
LOCATION ZONE: FACE
LOCATION ZONE: AXILLAE

## 2025-07-21 NOTE — PROCEDURE: COUNSELING
Topical Retinoid counseling:  Patient advised to apply a pea-sized amount only at bedtime and wait 30 minutes after washing their face before applying.  If too drying, patient may add a non-comedogenic moisturizer. The patient verbalized understanding of the proper use and possible adverse effects of retinoids.  All of the patient's questions and concerns were addressed.
Tetracycline Pregnancy And Lactation Text: This medication is Pregnancy Category D and not consider safe during pregnancy. It is also excreted in breast milk.
Azithromycin Counseling:  I discussed with the patient the risks of azithromycin including but not limited to GI upset, allergic reaction, drug rash, diarrhea, and yeast infections.
Minocycline Counseling: Patient advised regarding possible photosensitivity and discoloration of the teeth, skin, lips, tongue and gums.  Patient instructed to avoid sunlight, if possible.  When exposed to sunlight, patients should wear protective clothing, sunglasses, and sunscreen.  The patient was instructed to call the office immediately if the following severe adverse effects occur:  hearing changes, easy bruising/bleeding, severe headache, or vision changes.  The patient verbalized understanding of the proper use and possible adverse effects of minocycline.  All of the patient's questions and concerns were addressed.
Erythromycin Pregnancy And Lactation Text: This medication is Pregnancy Category B and is considered safe during pregnancy. It is also excreted in breast milk.
Sarecycline Counseling: Patient advised regarding possible photosensitivity and discoloration of the teeth, skin, lips, tongue and gums.  Patient instructed to avoid sunlight, if possible.  When exposed to sunlight, patients should wear protective clothing, sunglasses, and sunscreen.  The patient was instructed to call the office immediately if the following severe adverse effects occur:  hearing changes, easy bruising/bleeding, severe headache, or vision changes.  The patient verbalized understanding of the proper use and possible adverse effects of sarecycline.  All of the patient's questions and concerns were addressed.
Aklief Pregnancy And Lactation Text: It is unknown if this medication is safe to use during pregnancy.  It is unknown if this medication is excreted in breast milk.  Breastfeeding women should use the topical cream on the smallest area of the skin for the shortest time needed while breastfeeding.  Do not apply to nipple and areola.
Bactrim Counseling:  I discussed with the patient the risks of sulfa antibiotics including but not limited to GI upset, allergic reaction, drug rash, diarrhea, dizziness, photosensitivity, and yeast infections.  Rarely, more serious reactions can occur including but not limited to aplastic anemia, agranulocytosis, methemoglobinemia, blood dyscrasias, liver or kidney failure, lung infiltrates or desquamative/blistering drug rashes.
Doxycycline Pregnancy And Lactation Text: This medication is Pregnancy Category D and not consider safe during pregnancy. It is also excreted in breast milk but is considered safe for shorter treatment courses.
Tazorac Counseling:  Patient advised that medication is irritating and drying.  Patient may need to apply sparingly and wash off after an hour before eventually leaving it on overnight.  The patient verbalized understanding of the proper use and possible adverse effects of tazorac.  All of the patient's questions and concerns were addressed.
Winlevi Pregnancy And Lactation Text: This medication is considered safe during pregnancy and breastfeeding.
Use Enhanced Medication Counseling?: No
Isotretinoin Counseling: Patient should get monthly blood tests, not donate blood, not drive at night if vision affected, not share medication, and not undergo elective surgery for 6 months after tx completed. Side effects reviewed, pt to contact office should one occur.
Include Pregnancy/Lactation Warning?: Add Automatically Based on Childbearing Potential and Patient Age
Dapsone Pregnancy And Lactation Text: This medication is Pregnancy Category C and is not considered safe during pregnancy or breast feeding.
Topical Sulfur Applications Pregnancy And Lactation Text: This medication is Pregnancy Category C and has an unknown safety profile during pregnancy. It is unknown if this topical medication is excreted in breast milk.
High Dose Vitamin A Counseling: Side effects reviewed, pt to contact office should one occur.
Benzoyl Peroxide Counseling: Patient counseled that medicine may cause skin irritation and bleach clothing.  In the event of skin irritation, the patient was advised to reduce the amount of the drug applied or use it less frequently.   The patient verbalized understanding of the proper use and possible adverse effects of benzoyl peroxide.  All of the patient's questions and concerns were addressed.
Detail Level: Detailed
Spironolactone Pregnancy And Lactation Text: This medication can cause feminization of the male fetus and should be avoided during pregnancy. The active metabolite is also found in breast milk.
Topical Clindamycin Pregnancy And Lactation Text: This medication is Pregnancy Category B and is considered safe during pregnancy. It is unknown if it is excreted in breast milk.
Birth Control Pills Pregnancy And Lactation Text: This medication should be avoided if pregnant and for the first 30 days post-partum.
High Dose Vitamin A Pregnancy And Lactation Text: High dose vitamin A therapy is contraindicated during pregnancy and breast feeding.
Doxycycline Counseling:  Patient counseled regarding possible photosensitivity and increased risk for sunburn.  Patient instructed to avoid sunlight, if possible.  When exposed to sunlight, patients should wear protective clothing, sunglasses, and sunscreen.  The patient was instructed to call the office immediately if the following severe adverse effects occur:  hearing changes, easy bruising/bleeding, severe headache, or vision changes.  The patient verbalized understanding of the proper use and possible adverse effects of doxycycline.  All of the patient's questions and concerns were addressed.
Winlevi Counseling:  I discussed with the patient the risks of topical clascoterone including but not limited to erythema, scaling, itching, and stinging. Patient voiced their understanding.
Bactrim Pregnancy And Lactation Text: This medication is Pregnancy Category D and is known to cause fetal risk.  It is also excreted in breast milk.
Azelaic Acid Counseling: Patient counseled that medicine may cause skin irritation and to avoid applying near the eyes.  In the event of skin irritation, the patient was advised to reduce the amount of the drug applied or use it less frequently.   The patient verbalized understanding of the proper use and possible adverse effects of azelaic acid.  All of the patient's questions and concerns were addressed.
Tazorac Pregnancy And Lactation Text: This medication is not safe during pregnancy. It is unknown if this medication is excreted in breast milk.
Erythromycin Counseling:  I discussed with the patient the risks of erythromycin including but not limited to GI upset, allergic reaction, drug rash, diarrhea, increase in liver enzymes, and yeast infections.
Aklief counseling:  Patient advised to apply a pea-sized amount only at bedtime and wait 30 minutes after washing their face before applying.  If too drying, patient may add a non-comedogenic moisturizer.  The most commonly reported side effects including irritation, redness, scaling, dryness, stinging, burning, itching, and increased risk of sunburn.  The patient verbalized understanding of the proper use and possible adverse effects of retinoids.  All of the patient's questions and concerns were addressed.
Topical Retinoid Pregnancy And Lactation Text: This medication is Pregnancy Category C. It is unknown if this medication is excreted in breast milk.
Azithromycin Pregnancy And Lactation Text: This medication is considered safe during pregnancy and is also secreted in breast milk.
Topical Clindamycin Counseling: Patient counseled that this medication may cause skin irritation or allergic reactions.  In the event of skin irritation, the patient was advised to reduce the amount of the drug applied or use it less frequently.   The patient verbalized understanding of the proper use and possible adverse effects of clindamycin.  All of the patient's questions and concerns were addressed.
Birth Control Pills Counseling: Birth Control Pill Counseling: I discussed with the patient the potential side effects of OCPs including but not limited to increased risk of stroke, heart attack, thrombophlebitis, deep venous thrombosis, hepatic adenomas, breast changes, GI upset, headaches, and depression.  The patient verbalized understanding of the proper use and possible adverse effects of OCPs. All of the patient's questions and concerns were addressed.
Azelaic Acid Pregnancy And Lactation Text: This medication is considered safe during pregnancy and breast feeding.
Tetracycline Counseling: Patient counseled regarding possible photosensitivity and increased risk for sunburn.  Patient instructed to avoid sunlight, if possible.  When exposed to sunlight, patients should wear protective clothing, sunglasses, and sunscreen.  The patient was instructed to call the office immediately if the following severe adverse effects occur:  hearing changes, easy bruising/bleeding, severe headache, or vision changes.  The patient verbalized understanding of the proper use and possible adverse effects of tetracycline.  All of the patient's questions and concerns were addressed. Patient understands to avoid pregnancy while on therapy due to potential birth defects.
Benzoyl Peroxide Pregnancy And Lactation Text: This medication is Pregnancy Category C. It is unknown if benzoyl peroxide is excreted in breast milk.
Dapsone Counseling: I discussed with the patient the risks of dapsone including but not limited to hemolytic anemia, agranulocytosis, rashes, methemoglobinemia, kidney failure, peripheral neuropathy, headaches, GI upset, and liver toxicity.  Patients who start dapsone require monitoring including baseline LFTs and weekly CBCs for the first month, then every month thereafter.  The patient verbalized understanding of the proper use and possible adverse effects of dapsone.  All of the patient's questions and concerns were addressed.
Topical Sulfur Applications Counseling: Topical Sulfur Counseling: Patient counseled that this medication may cause skin irritation or allergic reactions.  In the event of skin irritation, the patient was advised to reduce the amount of the drug applied or use it less frequently.   The patient verbalized understanding of the proper use and possible adverse effects of topical sulfur application.  All of the patient's questions and concerns were addressed.
Isotretinoin Pregnancy And Lactation Text: This medication is Pregnancy Category X and is considered extremely dangerous during pregnancy. It is unknown if it is excreted in breast milk.
Spironolactone Counseling: Patient advised regarding risks of diarrhea, abdominal pain, hyperkalemia, birth defects (for female patients), liver toxicity and renal toxicity. The patient may need blood work to monitor liver and kidney function and potassium levels while on therapy. The patient verbalized understanding of the proper use and possible adverse effects of spironolactone.  All of the patient's questions and concerns were addressed.
Medical Necessity Information: LCD Guidelines vary from payer to payer. Please check with your payer's policy to determine medical necessity.
Medical Necessity Clause: Botulinum toxin hyperhidrosis therapy is medically necessary because

## 2025-07-21 NOTE — PROCEDURE: PRESCRIPTION MEDICATION MANAGEMENT
Detail Level: Zone
Initiate Treatment: tretinoin 0.025 % topical cream QHS: Apply a pea sized amount to the face QHS 2-3 times a week until tolerated nightly
Render In Strict Bullet Format?: No
Initiate Treatment: Qbrexza 2.4 % towelette : Wipe affected area (armpits) once daily
Plan: Tried and failed aluminum chloride and glycopyrrolate tablets. Sweating interfering with patients daily activities and social abilities

## 2025-08-15 ENCOUNTER — DOCUMENTATION (OUTPATIENT)
Dept: ENDOCRINOLOGY | Facility: HOSPITAL | Age: 25
End: 2025-08-15